# Patient Record
Sex: MALE | Race: WHITE | NOT HISPANIC OR LATINO | Employment: OTHER | URBAN - NONMETROPOLITAN AREA
[De-identification: names, ages, dates, MRNs, and addresses within clinical notes are randomized per-mention and may not be internally consistent; named-entity substitution may affect disease eponyms.]

---

## 2018-01-02 ENCOUNTER — OFFICE VISIT (OUTPATIENT)
Dept: FAMILY MEDICINE CLINIC | Facility: CLINIC | Age: 68
End: 2018-01-02

## 2018-01-02 ENCOUNTER — TELEPHONE (OUTPATIENT)
Dept: FAMILY MEDICINE CLINIC | Facility: CLINIC | Age: 68
End: 2018-01-02

## 2018-01-02 VITALS
OXYGEN SATURATION: 99 % | DIASTOLIC BLOOD PRESSURE: 86 MMHG | BODY MASS INDEX: 23.85 KG/M2 | SYSTOLIC BLOOD PRESSURE: 154 MMHG | HEIGHT: 69 IN | HEART RATE: 67 BPM | WEIGHT: 161 LBS

## 2018-01-02 DIAGNOSIS — R39.12 WEAK URINE STREAM: ICD-10-CM

## 2018-01-02 DIAGNOSIS — R03.0 ELEVATED BLOOD PRESSURE READING: ICD-10-CM

## 2018-01-02 DIAGNOSIS — E78.49 OTHER HYPERLIPIDEMIA: Primary | ICD-10-CM

## 2018-01-02 DIAGNOSIS — E55.9 VITAMIN D DEFICIENCY DISEASE: ICD-10-CM

## 2018-01-02 LAB
25(OH)D3 SERPL-MCNC: 27 NG/ML
ALBUMIN SERPL-MCNC: 4.3 G/DL (ref 3.4–4.8)
ALBUMIN/GLOB SERPL: 1.5 G/DL (ref 1.5–2.5)
ALP SERPL-CCNC: 57 U/L (ref 40–129)
ALT SERPL W P-5'-P-CCNC: 25 U/L (ref 10–44)
ANION GAP SERPL CALCULATED.3IONS-SCNC: 7.5 MMOL/L (ref 3.6–11.2)
AST SERPL-CCNC: 24 U/L (ref 10–34)
BASOPHILS # BLD AUTO: 0.01 10*3/MM3 (ref 0–0.3)
BASOPHILS NFR BLD AUTO: 0.2 % (ref 0–2)
BILIRUB SERPL-MCNC: 0.7 MG/DL (ref 0.2–1.8)
BUN BLD-MCNC: 14 MG/DL (ref 7–21)
BUN/CREAT SERPL: 13.7 (ref 7–25)
CALCIUM SPEC-SCNC: 8.9 MG/DL (ref 7.7–10)
CHLORIDE SERPL-SCNC: 105 MMOL/L (ref 99–112)
CHOLEST SERPL-MCNC: 257 MG/DL (ref 0–200)
CO2 SERPL-SCNC: 28.5 MMOL/L (ref 24.3–31.9)
CREAT BLD-MCNC: 1.02 MG/DL (ref 0.43–1.29)
DEPRECATED RDW RBC AUTO: 41 FL (ref 37–54)
EOSINOPHIL # BLD AUTO: 0.1 10*3/MM3 (ref 0–0.7)
EOSINOPHIL NFR BLD AUTO: 1.8 % (ref 0–7)
ERYTHROCYTE [DISTWIDTH] IN BLOOD BY AUTOMATED COUNT: 12.5 % (ref 11.5–14.5)
GFR SERPL CREATININE-BSD FRML MDRD: 73 ML/MIN/1.73
GLOBULIN UR ELPH-MCNC: 2.9 GM/DL
GLUCOSE BLD-MCNC: 102 MG/DL (ref 70–110)
HCT VFR BLD AUTO: 46.2 % (ref 42–52)
HDLC SERPL-MCNC: 52 MG/DL (ref 60–100)
HGB BLD-MCNC: 14.8 G/DL (ref 14–18)
IMM GRANULOCYTES # BLD: 0.01 10*3/MM3 (ref 0–0.03)
IMM GRANULOCYTES NFR BLD: 0.2 % (ref 0–0.5)
LDLC SERPL CALC-MCNC: 185 MG/DL (ref 0–100)
LDLC/HDLC SERPL: 3.55 {RATIO}
LYMPHOCYTES # BLD AUTO: 1.59 10*3/MM3 (ref 1–3)
LYMPHOCYTES NFR BLD AUTO: 28.2 % (ref 16–46)
MAGNESIUM SERPL-MCNC: 2.2 MG/DL (ref 1.7–2.6)
MCH RBC QN AUTO: 29 PG (ref 27–33)
MCHC RBC AUTO-ENTMCNC: 32 G/DL (ref 33–37)
MCV RBC AUTO: 90.6 FL (ref 80–94)
MONOCYTES # BLD AUTO: 0.51 10*3/MM3 (ref 0.1–0.9)
MONOCYTES NFR BLD AUTO: 9 % (ref 0–12)
NEUTROPHILS # BLD AUTO: 3.42 10*3/MM3 (ref 1.4–6.5)
NEUTROPHILS NFR BLD AUTO: 60.6 % (ref 40–75)
OSMOLALITY SERPL CALC.SUM OF ELEC: 281.9 MOSM/KG (ref 273–305)
PLATELET # BLD AUTO: 269 10*3/MM3 (ref 130–400)
PMV BLD AUTO: 10.7 FL (ref 6–10)
POTASSIUM BLD-SCNC: 4.4 MMOL/L (ref 3.5–5.3)
PROT SERPL-MCNC: 7.2 G/DL (ref 6–8)
PSA SERPL-MCNC: 2.19 NG/ML (ref 0–4)
RBC # BLD AUTO: 5.1 10*6/MM3 (ref 4.7–6.1)
SODIUM BLD-SCNC: 141 MMOL/L (ref 135–153)
TRIGL SERPL-MCNC: 102 MG/DL (ref 0–150)
TSH SERPL DL<=0.05 MIU/L-ACNC: 1.43 MIU/ML (ref 0.55–4.78)
VLDLC SERPL-MCNC: 20.4 MG/DL
WBC NRBC COR # BLD: 5.64 10*3/MM3 (ref 4.5–12.5)

## 2018-01-02 PROCEDURE — 82306 VITAMIN D 25 HYDROXY: CPT | Performed by: NURSE PRACTITIONER

## 2018-01-02 PROCEDURE — 80053 COMPREHEN METABOLIC PANEL: CPT | Performed by: NURSE PRACTITIONER

## 2018-01-02 PROCEDURE — 99214 OFFICE O/P EST MOD 30 MIN: CPT | Performed by: NURSE PRACTITIONER

## 2018-01-02 PROCEDURE — 84153 ASSAY OF PSA TOTAL: CPT | Performed by: NURSE PRACTITIONER

## 2018-01-02 PROCEDURE — 80061 LIPID PANEL: CPT | Performed by: NURSE PRACTITIONER

## 2018-01-02 PROCEDURE — 84443 ASSAY THYROID STIM HORMONE: CPT | Performed by: NURSE PRACTITIONER

## 2018-01-02 PROCEDURE — 85025 COMPLETE CBC W/AUTO DIFF WBC: CPT | Performed by: NURSE PRACTITIONER

## 2018-01-02 PROCEDURE — 83735 ASSAY OF MAGNESIUM: CPT | Performed by: NURSE PRACTITIONER

## 2018-01-02 RX ORDER — CHLORAL HYDRATE 500 MG
1200 CAPSULE ORAL
COMMUNITY

## 2018-01-02 RX ORDER — ASPIRIN 81 MG/1
81 TABLET ORAL DAILY
COMMUNITY
End: 2020-03-03

## 2018-01-02 NOTE — PROGRESS NOTES
Subjective   Jose Luis Ahuja is a 67 y.o. male.     Chief Complaint: Follow-up (fasting if labs are needed )    History of Present Illness   Pt here for follow up today. He has not been to the office for visit in two years.      Skin cancers.  Pt is currently seeing dermatology.    Elevated BP.  Pt BP is elevated today.  He states that he checks his BP at home weekly and his BP is usually controlled at 130s/70s.  He denies any chest pain, SOA, headaches, dizziness or syncope.  He tries to watch his diet and eats generally healthy foods.  He does not use excessive salt in his diet.  He does not wish to start any medication today.  I have discussed the importance of controlled BP.    HLD.  Pt has had an elevated cholesterol level in the past.  He was prescribed Pravachol but did not take any medication.  He is currently taking Garlic and Fish Oil and does not wish to take any other medication.  He exercises daily using his treadmill.  He is doing well.  He has had a stress test in 2015 which was normal.      Pt had screening colonoscopy in 2015 with normal findings.   Hx of vit d def.    Pt does have S/S of BPH.  He states that he does have a weak urine stream at times.  Stream starts and stops.  He denies any dysuria or hematuria.  He has not had a PSA level checked in some time.        Family History   Problem Relation Age of Onset   • Heart disease Mother    • Heart failure Mother    • Liver cancer Father    • Heart attack Father        Social History     Social History   • Marital status:      Spouse name: N/A   • Number of children: N/A   • Years of education: N/A     Occupational History   • Not on file.     Social History Main Topics   • Smoking status: Never Smoker   • Smokeless tobacco: Never Used   • Alcohol use No   • Drug use: No   • Sexual activity: Defer     Other Topics Concern   • Not on file     Social History Narrative   • No narrative on file       Past Medical History:   Diagnosis Date   •  "Hyperlipidemia    • Skin cancer        Review of Systems   Constitutional: Negative.    HENT: Negative.    Respiratory: Negative.    Cardiovascular: Negative.    Gastrointestinal: Negative.    Genitourinary: Positive for decreased urine volume. Negative for difficulty urinating, frequency, hematuria, penile pain, testicular pain and urgency.   Musculoskeletal: Negative.    Skin: Negative.    Neurological: Negative.    Psychiatric/Behavioral: Negative.        Objective   Physical Exam   Constitutional: He is oriented to person, place, and time. He appears well-developed and well-nourished.   Neck: Normal range of motion. Neck supple.   Cardiovascular: Normal rate, regular rhythm and normal heart sounds.    Pulmonary/Chest: Effort normal and breath sounds normal.   Neurological: He is alert and oriented to person, place, and time.   Skin: Skin is warm and dry.   Psychiatric: He has a normal mood and affect. His behavior is normal. Judgment and thought content normal.   Nursing note and vitals reviewed.      Procedures    Vitals: Blood pressure 154/86, pulse 67, height 175.3 cm (69\"), weight 73 kg (161 lb), SpO2 99 %.    Allergies: No Known Allergies       Assessment/Plan   Jose Luis was seen today for follow-up.    Diagnoses and all orders for this visit:    Other hyperlipidemia  -     CBC & Differential  -     Comprehensive Metabolic Panel  -     Lipid Panel  -     Magnesium  -     TSH  -     Vitamin D 25 Hydroxy  -     PSA DIAGNOSTIC  -     CBC Auto Differential    Elevated blood pressure reading  -     CBC & Differential  -     Comprehensive Metabolic Panel  -     Lipid Panel  -     Magnesium  -     TSH  -     Vitamin D 25 Hydroxy  -     PSA DIAGNOSTIC  -     CBC Auto Differential    Weak urine stream  -     CBC & Differential  -     Comprehensive Metabolic Panel  -     Lipid Panel  -     Magnesium  -     TSH  -     Vitamin D 25 Hydroxy  -     PSA DIAGNOSTIC  -     CBC Auto Differential    Vitamin D deficiency " disease  -     Vitamin D 25 Hydroxy

## 2018-01-02 NOTE — TELEPHONE ENCOUNTER
----- Message from MINISTERIO Brady sent at 1/2/2018  2:51 PM EST -----  Labs appear to be stable.  Cholesterol is elevated but he does not wish to take medication.  Please let him know his values and please limit his fried foods.      Spoke with patient & he verbalized understanding.

## 2018-07-11 ENCOUNTER — OFFICE VISIT (OUTPATIENT)
Dept: RETAIL CLINIC | Facility: CLINIC | Age: 68
End: 2018-07-11

## 2018-07-11 VITALS
OXYGEN SATURATION: 97 % | RESPIRATION RATE: 18 BRPM | SYSTOLIC BLOOD PRESSURE: 122 MMHG | WEIGHT: 160 LBS | HEIGHT: 69 IN | DIASTOLIC BLOOD PRESSURE: 82 MMHG | TEMPERATURE: 97.7 F | HEART RATE: 76 BPM | BODY MASS INDEX: 23.7 KG/M2

## 2018-07-11 DIAGNOSIS — J30.1 ACUTE SEASONAL ALLERGIC RHINITIS DUE TO POLLEN: ICD-10-CM

## 2018-07-11 DIAGNOSIS — J02.9 SORE THROAT: ICD-10-CM

## 2018-07-11 LAB
EXPIRATION DATE: NORMAL
INTERNAL CONTROL: NORMAL
Lab: NORMAL
S PYO AG THROAT QL: NEGATIVE

## 2018-07-11 PROCEDURE — 87880 STREP A ASSAY W/OPTIC: CPT | Performed by: NURSE PRACTITIONER

## 2018-07-11 PROCEDURE — 99213 OFFICE O/P EST LOW 20 MIN: CPT | Performed by: NURSE PRACTITIONER

## 2018-07-11 RX ORDER — DIPHENHYDRAMINE, LIDOCAINE, NYSTATIN
5 KIT ORAL 4 TIMES DAILY
Qty: 240 ML | Refills: 0 | Status: SHIPPED | OUTPATIENT
Start: 2018-07-11 | End: 2019-02-15

## 2018-07-11 RX ORDER — AZELASTINE 1 MG/ML
1 SPRAY, METERED NASAL 2 TIMES DAILY
Qty: 1 EACH | Refills: 0 | Status: SHIPPED | OUTPATIENT
Start: 2018-07-11 | End: 2020-03-03

## 2018-07-11 NOTE — PROGRESS NOTES
"Jose Luis Tena) presents to the clinic today c/o upper respiratory symptoms which started  three days ago. Associated symptoms include nasal congestion/drainage, sore throat, and hoarseness. He has tried salt water gargles without adequate relief.     URI    This is a new problem. The current episode started in the past 7 days. The problem has been unchanged. There has been no fever. Associated symptoms include congestion and a sore throat. Pertinent negatives include no coughing, ear pain, headaches, nausea, rash, sinus pain, sneezing, vomiting or wheezing. He has tried NSAIDs (Salt water gargles) for the symptoms. The treatment provided mild relief.     Refer to ROS for additional information.  Vitals:    07/11/18 0948   BP: 122/82   BP Location: Left arm   Patient Position: Sitting   Cuff Size: Adult   Pulse: 76   Resp: 18   Temp: 97.7 °F (36.5 °C)   TempSrc: Oral   SpO2: 97%   Weight: 72.6 kg (160 lb)   Height: 175.3 cm (69\")      The following portions of the patient's history were reviewed and updated as appropriate: allergies, current medications, past family history, past medical history, past social history, past surgical history and problem list.    Review of Systems   Constitutional: Negative for activity change, appetite change, fatigue and fever.   HENT: Positive for congestion, hearing loss (Chronic), postnasal drip, sore throat and voice change. Negative for ear pain, sinus pain, sinus pressure, sneezing, tinnitus and trouble swallowing.    Eyes: Negative for discharge and redness.   Respiratory: Negative for cough, chest tightness and wheezing.    Gastrointestinal: Negative for nausea and vomiting.   Musculoskeletal: Negative for myalgias.   Skin: Negative for color change and rash.   Neurological: Negative for headaches.   Hematological: Negative for adenopathy.   All other systems reviewed and are negative.    Physical Exam   Constitutional: He is oriented to person, place, and time. He appears " well-developed and well-nourished. No distress.   HENT:   Head: Normocephalic.   Right Ear: Ear canal normal. Tympanic membrane is not erythematous and not bulging. A middle ear effusion is present. Decreased hearing is noted.   Left Ear: Ear canal normal. Tympanic membrane is not erythematous and not bulging. A middle ear effusion is present. Decreased hearing is noted.   Nose: Mucosal edema present. No sinus tenderness. Right sinus exhibits no maxillary sinus tenderness and no frontal sinus tenderness. Left sinus exhibits no maxillary sinus tenderness and no frontal sinus tenderness.   Mouth/Throat: Mucous membranes are normal. Oral lesions present. No uvula swelling. No oropharyngeal exudate or posterior oropharyngeal erythema.       Eyes: Conjunctivae are normal. Pupils are equal, round, and reactive to light. Right eye exhibits no discharge. Left eye exhibits no discharge. No scleral icterus.   Neck: Neck supple. No neck rigidity.   Cardiovascular: Normal rate, regular rhythm and normal heart sounds.  Exam reveals no friction rub.    No murmur heard.  Pulmonary/Chest: Effort normal and breath sounds normal. No respiratory distress. He has no decreased breath sounds. He has no wheezes. He has no rhonchi. He has no rales.   Musculoskeletal: He exhibits no edema.   Lymphadenopathy:        Head (right side): No tonsillar adenopathy present.        Head (left side): No tonsillar adenopathy present.     He has no cervical adenopathy.   Neurological: He is alert and oriented to person, place, and time.   Skin: Skin is warm and dry. No rash noted. No erythema.   Psychiatric: He has a normal mood and affect. His behavior is normal. Thought content normal.   Vitals reviewed.    Assessment/Plan   Problems Addressed this Visit     None      Visit Diagnoses     Acute seasonal allergic rhinitis due to pollen        Findings and recommendations discussed with Dale. Reviewed his strep test which was negative.     Relevant  Medications    azelastine (ASTELIN) 0.1 % nasal spray    Sore throat        Reviewed his strep test which was negative.     Relevant Medications    nystatin susp + lidocaine viscous (MAGIC MOUTHWASH) oral suspension    Other Relevant Orders    POC Rapid Strep A (Completed)        Findings and recommendations discussed with Dale. Reviewed his strep test which was negative. Offered throat culture which he declined. Treatment options reviewed. Counseled regarding supportive care measures. Encouraged him to seek further medical evaluation if symptoms worsen or do not improve within 48-72 hours.  Lab Results   Component Value Date    RAPSCRN Negative 07/11/2018

## 2018-07-11 NOTE — PATIENT INSTRUCTIONS
Pharyngitis  Pharyngitis is a sore throat (pharynx). There is redness, pain, and swelling of your throat.  Follow these instructions at home:  · Drink enough fluids to keep your pee (urine) clear or pale yellow.  · Only take medicine as told by your doctor.  ? You may get sick again if you do not take medicine as told. Finish your medicines, even if you start to feel better.  ? Do not take aspirin.  · Rest.  · Rinse your mouth (gargle) with salt water (½ tsp of salt per 1 qt of water) every 1-2 hours. This will help the pain.  · If you are not at risk for choking, you can suck on hard candy or sore throat lozenges.  Contact a doctor if:  · You have large, tender lumps on your neck.  · You have a rash.  · You cough up green, yellow-brown, or bloody spit.  Get help right away if:  · You have a stiff neck.  · You drool or cannot swallow liquids.  · You throw up (vomit) or are not able to keep medicine or liquids down.  · You have very bad pain that does not go away with medicine.  · You have problems breathing (not from a stuffy nose).  This information is not intended to replace advice given to you by your health care provider. Make sure you discuss any questions you have with your health care provider.  Document Released: 06/05/2009 Document Revised: 05/25/2017 Document Reviewed: 08/25/2014  [employer] Interactive Patient Education © 2017 [employer]

## 2019-02-15 ENCOUNTER — OFFICE VISIT (OUTPATIENT)
Dept: FAMILY MEDICINE CLINIC | Facility: CLINIC | Age: 69
End: 2019-02-15

## 2019-02-15 VITALS
SYSTOLIC BLOOD PRESSURE: 136 MMHG | WEIGHT: 157 LBS | OXYGEN SATURATION: 97 % | BODY MASS INDEX: 23.25 KG/M2 | HEIGHT: 69 IN | TEMPERATURE: 98 F | HEART RATE: 75 BPM | DIASTOLIC BLOOD PRESSURE: 81 MMHG

## 2019-02-15 DIAGNOSIS — E78.49 OTHER HYPERLIPIDEMIA: Primary | ICD-10-CM

## 2019-02-15 DIAGNOSIS — E55.9 VITAMIN D DEFICIENCY DISEASE: ICD-10-CM

## 2019-02-15 LAB
25(OH)D3 SERPL-MCNC: 32 NG/ML
ALBUMIN SERPL-MCNC: 4.4 G/DL (ref 3.4–4.8)
ALBUMIN/GLOB SERPL: 1.6 G/DL (ref 1.5–2.5)
ALP SERPL-CCNC: 61 U/L (ref 40–129)
ALT SERPL W P-5'-P-CCNC: 28 U/L (ref 10–44)
ANION GAP SERPL CALCULATED.3IONS-SCNC: 4.6 MMOL/L (ref 3.6–11.2)
AST SERPL-CCNC: 24 U/L (ref 10–34)
BASOPHILS # BLD AUTO: 0.01 10*3/MM3 (ref 0–0.3)
BASOPHILS NFR BLD AUTO: 0.2 % (ref 0–2)
BILIRUB SERPL-MCNC: 0.8 MG/DL (ref 0.2–1.8)
BUN BLD-MCNC: 12 MG/DL (ref 7–21)
BUN/CREAT SERPL: 11.7 (ref 7–25)
CALCIUM SPEC-SCNC: 9.5 MG/DL (ref 7.7–10)
CHLORIDE SERPL-SCNC: 105 MMOL/L (ref 99–112)
CHOLEST SERPL-MCNC: 213 MG/DL (ref 0–200)
CO2 SERPL-SCNC: 28.4 MMOL/L (ref 24.3–31.9)
CREAT BLD-MCNC: 1.03 MG/DL (ref 0.43–1.29)
DEPRECATED RDW RBC AUTO: 40.9 FL (ref 37–54)
EOSINOPHIL # BLD AUTO: 0.14 10*3/MM3 (ref 0–0.7)
EOSINOPHIL NFR BLD AUTO: 2.3 % (ref 0–7)
ERYTHROCYTE [DISTWIDTH] IN BLOOD BY AUTOMATED COUNT: 12.5 % (ref 11.5–14.5)
GFR SERPL CREATININE-BSD FRML MDRD: 72 ML/MIN/1.73
GLOBULIN UR ELPH-MCNC: 2.7 GM/DL
GLUCOSE BLD-MCNC: 92 MG/DL (ref 70–110)
HCT VFR BLD AUTO: 46.7 % (ref 42–52)
HDLC SERPL-MCNC: 47 MG/DL (ref 60–100)
HGB BLD-MCNC: 15.3 G/DL (ref 14–18)
IMM GRANULOCYTES # BLD AUTO: 0.01 10*3/MM3 (ref 0–0.03)
IMM GRANULOCYTES NFR BLD AUTO: 0.2 % (ref 0–0.5)
LDLC SERPL CALC-MCNC: 144 MG/DL (ref 0–100)
LDLC/HDLC SERPL: 3.06 {RATIO}
LYMPHOCYTES # BLD AUTO: 1.76 10*3/MM3 (ref 1–3)
LYMPHOCYTES NFR BLD AUTO: 28.5 % (ref 16–46)
MAGNESIUM SERPL-MCNC: 2.3 MG/DL (ref 1.7–2.6)
MCH RBC QN AUTO: 29.4 PG (ref 27–33)
MCHC RBC AUTO-ENTMCNC: 32.8 G/DL (ref 33–37)
MCV RBC AUTO: 89.6 FL (ref 80–94)
MONOCYTES # BLD AUTO: 0.51 10*3/MM3 (ref 0.1–0.9)
MONOCYTES NFR BLD AUTO: 8.3 % (ref 0–12)
NEUTROPHILS # BLD AUTO: 3.74 10*3/MM3 (ref 1.4–6.5)
NEUTROPHILS NFR BLD AUTO: 60.5 % (ref 40–75)
OSMOLALITY SERPL CALC.SUM OF ELEC: 275.1 MOSM/KG (ref 273–305)
PLATELET # BLD AUTO: 281 10*3/MM3 (ref 130–400)
PMV BLD AUTO: 10.8 FL (ref 6–10)
POTASSIUM BLD-SCNC: 4.2 MMOL/L (ref 3.5–5.3)
PROT SERPL-MCNC: 7.1 G/DL (ref 6–8)
RBC # BLD AUTO: 5.21 10*6/MM3 (ref 4.7–6.1)
SODIUM BLD-SCNC: 138 MMOL/L (ref 135–153)
TRIGL SERPL-MCNC: 110 MG/DL (ref 0–150)
TSH SERPL DL<=0.05 MIU/L-ACNC: 1.12 MIU/ML (ref 0.55–4.78)
VIT B12 BLD-MCNC: 343 PG/ML (ref 211–911)
VLDLC SERPL-MCNC: 22 MG/DL
WBC NRBC COR # BLD: 6.17 10*3/MM3 (ref 4.5–12.5)

## 2019-02-15 PROCEDURE — 80053 COMPREHEN METABOLIC PANEL: CPT | Performed by: NURSE PRACTITIONER

## 2019-02-15 PROCEDURE — 82607 VITAMIN B-12: CPT | Performed by: NURSE PRACTITIONER

## 2019-02-15 PROCEDURE — 99213 OFFICE O/P EST LOW 20 MIN: CPT | Performed by: NURSE PRACTITIONER

## 2019-02-15 PROCEDURE — 84443 ASSAY THYROID STIM HORMONE: CPT | Performed by: NURSE PRACTITIONER

## 2019-02-15 PROCEDURE — 85025 COMPLETE CBC W/AUTO DIFF WBC: CPT | Performed by: NURSE PRACTITIONER

## 2019-02-15 PROCEDURE — 80061 LIPID PANEL: CPT | Performed by: NURSE PRACTITIONER

## 2019-02-15 PROCEDURE — 82306 VITAMIN D 25 HYDROXY: CPT | Performed by: NURSE PRACTITIONER

## 2019-02-15 PROCEDURE — 83735 ASSAY OF MAGNESIUM: CPT | Performed by: NURSE PRACTITIONER

## 2019-02-15 NOTE — PROGRESS NOTES
Subjective   Jose Luis Ahuja is a 68 y.o. male.     Chief Complaint: Follow-up; Hyperlipidemia; and Rectal Bleeding    Hyperlipidemia   This is a chronic problem. The current episode started more than 1 year ago. The problem is uncontrolled. Factors aggravating his hyperlipidemia include fatty foods. Current antihyperlipidemic treatment includes exercise.   Rectal Bleeding   This is a new (hx of diverticulosis; last colonoscopy 2015 with normal findings) problem. The current episode started more than 1 month ago. The problem has been resolved. Pertinent negatives include no abdominal pain, change in bowel habit or fatigue. Nothing aggravates the symptoms. He has tried nothing for the symptoms.     Pt has had an elevated cholesterol level in the past.  He was prescribed Pravachol but did not take any medication.  He is currently taking Garlic and Fish Oil and does not wish to take any other medication.  He exercises daily using his treadmill.  He is doing well.  He has had a stress test in 2015 which was normal.           Family History   Problem Relation Age of Onset   • Heart disease Mother    • Heart failure Mother    • Liver cancer Father    • Heart attack Father        Social History     Socioeconomic History   • Marital status:      Spouse name: Not on file   • Number of children: Not on file   • Years of education: Not on file   • Highest education level: Not on file   Social Needs   • Financial resource strain: Not on file   • Food insecurity - worry: Not on file   • Food insecurity - inability: Not on file   • Transportation needs - medical: Not on file   • Transportation needs - non-medical: Not on file   Occupational History   • Occupation: Not working   Tobacco Use   • Smoking status: Never Smoker   • Smokeless tobacco: Never Used   Substance and Sexual Activity   • Alcohol use: No   • Drug use: No   • Sexual activity: Defer   Other Topics Concern   • Not on file   Social History Narrative   • Not  "on file       Past Medical History:   Diagnosis Date   • Hyperlipidemia    • Skin cancer        Review of Systems   Constitutional: Negative.  Negative for fatigue.   HENT: Negative.    Respiratory: Negative.    Cardiovascular: Negative.    Gastrointestinal: Positive for hematochezia. Negative for abdominal pain and change in bowel habit.   Musculoskeletal: Negative.    Skin: Negative.    Neurological: Negative.    Psychiatric/Behavioral: Negative.        Objective   Physical Exam   Constitutional: He is oriented to person, place, and time. He appears well-developed and well-nourished.   Neck: Normal range of motion. Neck supple.   Cardiovascular: Normal rate, regular rhythm and normal heart sounds.   Pulmonary/Chest: Effort normal and breath sounds normal.   Neurological: He is alert and oriented to person, place, and time.   Skin: Skin is warm and dry.   Psychiatric: He has a normal mood and affect. His behavior is normal. Judgment and thought content normal.   Nursing note and vitals reviewed.      Procedures    Vitals: Blood pressure 136/81, pulse 75, temperature 98 °F (36.7 °C), temperature source Oral, height 175.3 cm (69\"), weight 71.2 kg (157 lb), SpO2 97 %.    Allergies: No Known Allergies     During this visit the following were done:  Labs Reviewed []    Labs Ordered []    Radiology Reports Reviewed []    Radiology Ordered []    PCP Records Reviewed []    Referring Provider Records Reviewed []    ER Records Reviewed []    Hospital Records Reviewed []    History Obtained From Family []    Radiology Images Reviewed []    Other Reviewed []    Records Requested []      Assessment/Plan   Jose Luis was seen today for follow-up, hyperlipidemia and rectal bleeding.    Diagnoses and all orders for this visit:    Other hyperlipidemia  -     CBC & Differential  -     Comprehensive Metabolic Panel  -     Magnesium  -     Lipid Panel  -     TSH  -     Vitamin B12  -     Vitamin D 25 Hydroxy    Vitamin D deficiency " disease  -     CBC & Differential  -     Comprehensive Metabolic Panel  -     Magnesium  -     Lipid Panel  -     TSH  -     Vitamin B12  -     Vitamin D 25 Hydroxy      Stool cards x3 given to patient with instructions

## 2019-02-19 ENCOUNTER — TELEPHONE (OUTPATIENT)
Dept: FAMILY MEDICINE CLINIC | Facility: CLINIC | Age: 69
End: 2019-02-19

## 2019-02-19 ENCOUNTER — CLINICAL SUPPORT (OUTPATIENT)
Dept: FAMILY MEDICINE CLINIC | Facility: CLINIC | Age: 69
End: 2019-02-19

## 2019-02-19 DIAGNOSIS — Z12.11 ENCOUNTER FOR SCREENING FECAL OCCULT BLOOD TESTING: Primary | ICD-10-CM

## 2019-02-19 LAB
EXPIRATION DATE: ABNORMAL
GASTROCULT GAST QL: POSITIVE
HEMOCCULT SP2 STL QL: POSITIVE
HEMOCCULT SP3 STL QL: POSITIVE
Lab: ABNORMAL

## 2019-02-19 PROCEDURE — 82274 ASSAY TEST FOR BLOOD FECAL: CPT | Performed by: NURSE PRACTITIONER

## 2019-02-19 NOTE — PROGRESS NOTES
Labs appear to be stable.  Cholesterol has improved.  However, his 3 occult blood stool cards were positive.  I suggest referral to gastro for evaluation.   Is he agreeable and who would he like to see?

## 2019-02-20 ENCOUNTER — TELEPHONE (OUTPATIENT)
Dept: FAMILY MEDICINE CLINIC | Facility: CLINIC | Age: 69
End: 2019-02-20

## 2019-02-20 DIAGNOSIS — R19.5 POSITIVE FECAL OCCULT BLOOD TEST: Primary | ICD-10-CM

## 2019-02-20 NOTE — TELEPHONE ENCOUNTER
----- Message from MINISTERIO Brady sent at 2/19/2019  6:10 PM EST -----  Labs appear to be stable.  Cholesterol has improved.  However, his 3 occult blood stool cards were positive.  I suggest referral to gastro for evaluation.   Is he agreeable and who would he like to see?    Spoke with patient & he verbalized understanding,agreeable to GI consult,no preference.

## 2019-09-03 ENCOUNTER — OFFICE VISIT (OUTPATIENT)
Dept: FAMILY MEDICINE CLINIC | Facility: CLINIC | Age: 69
End: 2019-09-03

## 2019-09-03 VITALS
SYSTOLIC BLOOD PRESSURE: 122 MMHG | BODY MASS INDEX: 22.96 KG/M2 | DIASTOLIC BLOOD PRESSURE: 80 MMHG | TEMPERATURE: 97.8 F | OXYGEN SATURATION: 98 % | HEIGHT: 69 IN | HEART RATE: 67 BPM | WEIGHT: 155 LBS

## 2019-09-03 DIAGNOSIS — Z00.00 ENCOUNTER FOR MEDICARE ANNUAL WELLNESS EXAM: Primary | ICD-10-CM

## 2019-09-03 PROCEDURE — G0438 PPPS, INITIAL VISIT: HCPCS | Performed by: NURSE PRACTITIONER

## 2019-09-03 RX ORDER — OMEGA-3S/DHA/EPA/FISH OIL/D3 300MG-1000
400 CAPSULE ORAL DAILY
COMMUNITY

## 2019-09-03 NOTE — PROGRESS NOTES
Initial Medicare Wellness Visit   The ABC's of the Annual Wellness Visit    Chief Complaint   Patient presents with   • Medicare Wellness-Initial Visit       HPI:  Jose Luis Ahuja YOB: 1950, is a 69 y.o. male who presents for an Initial Medicare Wellness Visit.    Recent Hospitalizations:  No hospitalization(s) within the last year..    Current Medical Providers:  Patient Care Team:  Shirley Moreno APRN as PCP - General  Shirley Moreno APRN as PCP - Family Medicine  Omaira Barber APRN as PCP - Claims Attributed    Health Habits and Functional and Cognitive Screening and Depression Screening:  Functional & Cognitive Status 9/3/2019   Do you have difficulty preparing food and eating? No   Do you have difficulty bathing yourself, getting dressed or grooming yourself? No   Do you have difficulty using the toilet? No   Do you have difficulty moving around from place to place? No   Do you have trouble with steps or getting out of a bed or a chair? No   Current Diet Well Balanced Diet   Dental Exam Not up to date   Eye Exam Not up to date   Exercise (times per week) 4 times per week   Current Exercise Activities Include Walking   Do you need help using the phone?  No   Are you deaf or do you have serious difficulty hearing?  Yes   Do you need help with transportation? No   Do you need help shopping? No   Do you need help preparing meals?  No   Do you need help with housework?  No   Do you need help with laundry? No   Do you need help taking your medications? No   Do you need help managing money? No   Do you ever drive or ride in a car without wearing a seat belt? No   Have you felt unusual stress, anger or loneliness in the last month? No   Who do you live with? Spouse   If you need help, do you have trouble finding someone available to you? No   Have you been bothered in the last four weeks by sexual problems? (No Data)   Do you have difficulty concentrating, remembering or making decisions? No        Compared to one year ago, the patient feels his physical health is the same and his mental health is the same.    Depression Screen:  PHQ-2/PHQ-9 Depression Screening 9/3/2019   Little interest or pleasure in doing things 0   Feeling down, depressed, or hopeless 0   Total Score 0         Past Medical/Family/Social History:  The following portions of the patient's history were reviewed and updated as appropriate: allergies, current medications, past family history, past medical history, past social history, past surgical history and problem list.    No Known Allergies      Current Outpatient Medications:   •  cholecalciferol (VITAMIN D3) 400 units tablet, Take 400 Units by mouth Daily., Disp: , Rfl:   •  GARLIC OIL PO, Take  by mouth., Disp: , Rfl:   •  Omega-3 Fatty Acids (FISH OIL) 1000 MG capsule capsule, Take 1,200 mg by mouth Daily With Breakfast., Disp: , Rfl:   •  aspirin 81 MG EC tablet, Take 81 mg by mouth Daily., Disp: , Rfl:   •  azelastine (ASTELIN) 0.1 % nasal spray, 1 spray into each nostril 2 (Two) Times a Day. 2 sprays both nostrils twice daily as needed, Disp: 1 each, Rfl: 0    Aspirin use counseling: Does not need ASA (and currently is not on it)    Current medication list contains no high risk medications.  No harmful drug interactions have been identified.     Family History   Problem Relation Age of Onset   • Heart disease Mother    • Heart failure Mother    • Liver cancer Father    • Heart attack Father        Social History     Tobacco Use   • Smoking status: Never Smoker   • Smokeless tobacco: Never Used   Substance Use Topics   • Alcohol use: No       Past Surgical History:   Procedure Laterality Date   • COLONOSCOPY  06/14/2019    Dr. Martines    • SKIN CANCER EXCISION      Mult.        Patient Active Problem List   Diagnosis   • Other hyperlipidemia   • Elevated blood pressure reading   • Vitamin D deficiency disease       Review of Systems   Constitutional: Negative.    HENT:  "Negative.    Respiratory: Negative.    Cardiovascular: Negative.    Gastrointestinal: Negative.    Musculoskeletal: Negative.    Skin: Negative.    Neurological: Negative.    Psychiatric/Behavioral: Negative.        Objective     Vitals:    09/03/19 1053   BP: 122/80   BP Location: Right arm   Patient Position: Sitting   Cuff Size: Adult   Pulse: 67   Temp: 97.8 °F (36.6 °C)   TempSrc: Oral   SpO2: 98%   Weight: 70.3 kg (155 lb)   Height: 175.3 cm (69\")       Patient's Body mass index is 22.89 kg/m². BMI is within normal parameters. No follow-up required..       Visual Acuity Screening    Right eye Left eye Both eyes   Without correction: 20/50 20/50 20/30   With correction:          The patient has no evidence of cognitve impairment.     Physical Exam   Constitutional: He is oriented to person, place, and time. He appears well-developed and well-nourished.   Eyes: Conjunctivae are normal.   Neck: Normal range of motion.   Cardiovascular: Normal rate, regular rhythm and normal heart sounds.   Pulmonary/Chest: Effort normal and breath sounds normal.   Musculoskeletal: Normal range of motion.   Neurological: He is alert and oriented to person, place, and time.   Skin: Skin is warm and dry.   Nursing note and vitals reviewed.      Recent Lab Results:     Lab Results   Component Value Date    CHOL 213 (H) 02/15/2019    TRIG 110 02/15/2019    HDL 47 (L) 02/15/2019    VLDL 22 02/15/2019    LDLHDL 3.06 02/15/2019         Assessment/Plan   Age-appropriate Screening Schedule:  Refer to the list below for future screening recommendations based on patient's age, sex and/or medical conditions.      Health Maintenance   Topic Date Due   • PNEUMOCOCCAL VACCINES (65+ LOW/MEDIUM RISK) (1 of 2 - PCV13) 09/03/2019 (Originally 5/29/2014)   • ZOSTER VACCINE (1 of 2) 09/03/2019 (Originally 5/29/1999)   • TDAP/TD VACCINES (1 - Tdap) 09/03/2020 (Originally 5/29/1969)   • LIPID PANEL  02/15/2020   • COLONOSCOPY  06/14/2029   • INFLUENZA " VACCINE  Addressed       Medicare Risks and Personalized Health Plan:  Cardiovascular risk  Glaucoma Risk  Inactivity/Sedentary  Prostate Cancer Screening       CMS-Preventive Services Quick Reference  Medicare Preventive Services Addressed:  Annual Wellness Visit (AWV)  Influenza Vaccine and Administration  Pneumococcal Vaccine and Administration  Prostate Cancer Screening     Advance Care Planning:  Patient has an advance directive - a copy has been provided and is visible in patient header    Diagnoses and all orders for this visit:    1. Encounter for Medicare annual wellness exam (Primary)        An After Visit Summary and PPPS with all of these plans were given to the patient.      Follow Up:  Return in about 6 months (around 3/3/2020).

## 2020-03-03 ENCOUNTER — OFFICE VISIT (OUTPATIENT)
Dept: FAMILY MEDICINE CLINIC | Facility: CLINIC | Age: 70
End: 2020-03-03

## 2020-03-03 VITALS
BODY MASS INDEX: 23.7 KG/M2 | HEIGHT: 69 IN | HEART RATE: 75 BPM | DIASTOLIC BLOOD PRESSURE: 76 MMHG | WEIGHT: 160 LBS | SYSTOLIC BLOOD PRESSURE: 130 MMHG | OXYGEN SATURATION: 99 % | TEMPERATURE: 98.3 F

## 2020-03-03 DIAGNOSIS — E78.49 OTHER HYPERLIPIDEMIA: Primary | ICD-10-CM

## 2020-03-03 DIAGNOSIS — Z12.5 SCREENING PSA (PROSTATE SPECIFIC ANTIGEN): ICD-10-CM

## 2020-03-03 DIAGNOSIS — E55.9 VITAMIN D DEFICIENCY DISEASE: ICD-10-CM

## 2020-03-03 PROCEDURE — G0103 PSA SCREENING: HCPCS | Performed by: NURSE PRACTITIONER

## 2020-03-03 PROCEDURE — 83735 ASSAY OF MAGNESIUM: CPT | Performed by: NURSE PRACTITIONER

## 2020-03-03 PROCEDURE — 80053 COMPREHEN METABOLIC PANEL: CPT | Performed by: NURSE PRACTITIONER

## 2020-03-03 PROCEDURE — 36415 COLL VENOUS BLD VENIPUNCTURE: CPT | Performed by: NURSE PRACTITIONER

## 2020-03-03 PROCEDURE — 82607 VITAMIN B-12: CPT | Performed by: NURSE PRACTITIONER

## 2020-03-03 PROCEDURE — 84443 ASSAY THYROID STIM HORMONE: CPT | Performed by: NURSE PRACTITIONER

## 2020-03-03 PROCEDURE — 99213 OFFICE O/P EST LOW 20 MIN: CPT | Performed by: NURSE PRACTITIONER

## 2020-03-03 PROCEDURE — 80061 LIPID PANEL: CPT | Performed by: NURSE PRACTITIONER

## 2020-03-03 PROCEDURE — 82306 VITAMIN D 25 HYDROXY: CPT | Performed by: NURSE PRACTITIONER

## 2020-03-03 PROCEDURE — 85025 COMPLETE CBC W/AUTO DIFF WBC: CPT | Performed by: NURSE PRACTITIONER

## 2020-03-03 NOTE — PROGRESS NOTES
Subjective   Jose Luis Ahuja is a 69 y.o. male.     Chief Complaint: Follow-up and Hyperlipidemia    History of Present Illness   Hyperlipidemia   This is a chronic problem. The current episode started more than 1 year ago. The problem is uncontrolled. Factors aggravating his hyperlipidemia include fatty foods. Current antihyperlipidemic treatment includes exercise.  Pt has had an elevated cholesterol level in the past.  He was prescribed Pravachol but did not take any medication.  He is currently taking Garlic and Fish Oil and does not wish to take any other medication.  He exercises daily using his treadmill.  He is doing well.  He has had a stress test in 2015 which was normal.    He has no complaints today.     Family History   Problem Relation Age of Onset   • Heart disease Mother    • Heart failure Mother    • Liver cancer Father    • Heart attack Father        Social History     Socioeconomic History   • Marital status:      Spouse name: Not on file   • Number of children: Not on file   • Years of education: Not on file   • Highest education level: Not on file   Occupational History   • Occupation: Not working   Tobacco Use   • Smoking status: Never Smoker   • Smokeless tobacco: Never Used   Substance and Sexual Activity   • Alcohol use: No   • Drug use: No   • Sexual activity: Defer       Past Medical History:   Diagnosis Date   • Colon polyps    • Hyperlipidemia    • Skin cancer    • Vitamin D deficiency        Review of Systems   Constitutional: Negative.    HENT: Negative.    Respiratory: Negative.    Cardiovascular: Negative.    Gastrointestinal: Negative.    Musculoskeletal: Negative.    Skin: Negative.    Neurological: Negative.    Psychiatric/Behavioral: Negative.        Objective   Physical Exam   Constitutional: He is oriented to person, place, and time. He appears well-developed and well-nourished.   Neck: Normal range of motion. Neck supple.   Cardiovascular: Normal rate, regular rhythm and  "normal heart sounds.   Pulmonary/Chest: Effort normal and breath sounds normal.   Neurological: He is alert and oriented to person, place, and time.   Skin: Skin is warm and dry.   Psychiatric: He has a normal mood and affect. His behavior is normal. Judgment and thought content normal.   Nursing note and vitals reviewed.      Procedures    Vitals: Blood pressure 130/76, pulse 75, temperature 98.3 °F (36.8 °C), temperature source Oral, height 175.3 cm (69\"), weight 72.6 kg (160 lb), SpO2 99 %.    Body mass index is 23.63 kg/m².     Allergies: No Known Allergies     During this visit the following were done:  Labs Reviewed []    Labs Ordered []    Radiology Reports Reviewed []    Radiology Ordered []    PCP Records Reviewed []    Referring Provider Records Reviewed []    ER Records Reviewed []    Hospital Records Reviewed []    History Obtained From Family []    Radiology Images Reviewed []    Other Reviewed []    Records Requested []      Assessment/Plan   Jose Luis was seen today for follow-up and hyperlipidemia.    Diagnoses and all orders for this visit:    Other hyperlipidemia  -     CBC & Differential; Future  -     Comprehensive Metabolic Panel; Future  -     Lipid Panel; Future  -     Magnesium; Future  -     TSH; Future  -     Vitamin B12; Future  -     Vitamin D 25 Hydroxy; Future  -     PSA Screen; Future  -     CBC & Differential  -     Comprehensive Metabolic Panel  -     Lipid Panel  -     Magnesium  -     TSH  -     Vitamin B12  -     Vitamin D 25 Hydroxy  -     PSA Screen  -     CBC Auto Differential    Vitamin D deficiency disease  -     CBC & Differential; Future  -     Comprehensive Metabolic Panel; Future  -     Lipid Panel; Future  -     Magnesium; Future  -     TSH; Future  -     Vitamin B12; Future  -     Vitamin D 25 Hydroxy; Future  -     PSA Screen; Future  -     CBC & Differential  -     Comprehensive Metabolic Panel  -     Lipid Panel  -     Magnesium  -     TSH  -     Vitamin B12  -     " Vitamin D 25 Hydroxy  -     PSA Screen  -     CBC Auto Differential    Screening PSA (prostate specific antigen)  -     PSA Screen; Future  -     PSA Screen

## 2020-03-04 ENCOUNTER — TELEPHONE (OUTPATIENT)
Dept: FAMILY MEDICINE CLINIC | Facility: CLINIC | Age: 70
End: 2020-03-04

## 2020-03-04 LAB
25(OH)D3 SERPL-MCNC: 23.2 NG/ML (ref 30–100)
ALBUMIN SERPL-MCNC: 4.3 G/DL (ref 3.5–5.2)
ALBUMIN/GLOB SERPL: 1.7 G/DL
ALP SERPL-CCNC: 61 U/L (ref 39–117)
ALT SERPL W P-5'-P-CCNC: 25 U/L (ref 1–41)
ANION GAP SERPL CALCULATED.3IONS-SCNC: 11.1 MMOL/L (ref 5–15)
AST SERPL-CCNC: 22 U/L (ref 1–40)
BASOPHILS # BLD AUTO: 0.02 10*3/MM3 (ref 0–0.2)
BASOPHILS NFR BLD AUTO: 0.3 % (ref 0–1.5)
BILIRUB SERPL-MCNC: 0.4 MG/DL (ref 0.2–1.2)
BUN BLD-MCNC: 12 MG/DL (ref 8–23)
BUN/CREAT SERPL: 11 (ref 7–25)
CALCIUM SPEC-SCNC: 9 MG/DL (ref 8.6–10.5)
CHLORIDE SERPL-SCNC: 100 MMOL/L (ref 98–107)
CHOLEST SERPL-MCNC: 246 MG/DL (ref 0–200)
CO2 SERPL-SCNC: 27.9 MMOL/L (ref 22–29)
CREAT BLD-MCNC: 1.09 MG/DL (ref 0.76–1.27)
DEPRECATED RDW RBC AUTO: 42.3 FL (ref 37–54)
EOSINOPHIL # BLD AUTO: 0.1 10*3/MM3 (ref 0–0.4)
EOSINOPHIL NFR BLD AUTO: 1.7 % (ref 0.3–6.2)
ERYTHROCYTE [DISTWIDTH] IN BLOOD BY AUTOMATED COUNT: 12.8 % (ref 12.3–15.4)
GFR SERPL CREATININE-BSD FRML MDRD: 67 ML/MIN/1.73
GLOBULIN UR ELPH-MCNC: 2.5 GM/DL
GLUCOSE BLD-MCNC: 95 MG/DL (ref 65–99)
HCT VFR BLD AUTO: 47 % (ref 37.5–51)
HDLC SERPL-MCNC: 48 MG/DL (ref 40–60)
HGB BLD-MCNC: 15.9 G/DL (ref 13–17.7)
IMM GRANULOCYTES # BLD AUTO: 0.02 10*3/MM3 (ref 0–0.05)
IMM GRANULOCYTES NFR BLD AUTO: 0.3 % (ref 0–0.5)
LDLC SERPL CALC-MCNC: 171 MG/DL (ref 0–100)
LDLC/HDLC SERPL: 3.57 {RATIO}
LYMPHOCYTES # BLD AUTO: 1.76 10*3/MM3 (ref 0.7–3.1)
LYMPHOCYTES NFR BLD AUTO: 29.2 % (ref 19.6–45.3)
MAGNESIUM SERPL-MCNC: 2.3 MG/DL (ref 1.6–2.4)
MCH RBC QN AUTO: 30.4 PG (ref 26.6–33)
MCHC RBC AUTO-ENTMCNC: 33.8 G/DL (ref 31.5–35.7)
MCV RBC AUTO: 89.9 FL (ref 79–97)
MONOCYTES # BLD AUTO: 0.49 10*3/MM3 (ref 0.1–0.9)
MONOCYTES NFR BLD AUTO: 8.1 % (ref 5–12)
NEUTROPHILS # BLD AUTO: 3.64 10*3/MM3 (ref 1.7–7)
NEUTROPHILS NFR BLD AUTO: 60.4 % (ref 42.7–76)
NRBC BLD AUTO-RTO: 0 /100 WBC (ref 0–0.2)
PLATELET # BLD AUTO: 277 10*3/MM3 (ref 140–450)
PMV BLD AUTO: 11.3 FL (ref 6–12)
POTASSIUM BLD-SCNC: 4.2 MMOL/L (ref 3.5–5.2)
PROT SERPL-MCNC: 6.8 G/DL (ref 6–8.5)
PSA SERPL-MCNC: 2.37 NG/ML (ref 0–4)
RBC # BLD AUTO: 5.23 10*6/MM3 (ref 4.14–5.8)
SODIUM BLD-SCNC: 139 MMOL/L (ref 136–145)
TRIGL SERPL-MCNC: 134 MG/DL (ref 0–150)
TSH SERPL DL<=0.05 MIU/L-ACNC: 1.61 UIU/ML (ref 0.27–4.2)
VIT B12 BLD-MCNC: 247 PG/ML (ref 211–946)
VLDLC SERPL-MCNC: 26.8 MG/DL (ref 5–40)
WBC NRBC COR # BLD: 6.03 10*3/MM3 (ref 3.4–10.8)

## 2020-03-04 NOTE — TELEPHONE ENCOUNTER
----- Message from MINISTERIO Brady sent at 3/4/2020 10:21 AM EST -----  His cholesterol is elevated more than usual.  Is he agreeable to taking a statin?    His  Vit D is a little low and I would suggest taking an otc daily Vit D supplement.  Please let him know.      Left a message to return call.      Spoke with patient & he verbalized understanding,he is not agreeable to a statin at this time,stated he would think about it & if he decides will let you know.

## 2020-03-04 NOTE — PROGRESS NOTES
His cholesterol is elevated more than usual.  Is he agreeable to taking a statin?    His  Vit D is a little low and I would suggest taking an otc daily Vit D supplement.  Please let him know.

## 2020-05-05 ENCOUNTER — TELEPHONE (OUTPATIENT)
Dept: FAMILY MEDICINE CLINIC | Facility: CLINIC | Age: 70
End: 2020-05-05

## 2020-05-05 NOTE — TELEPHONE ENCOUNTER
Patient called indicating he wasn't feeling well last night and took his temp that initially read as 98.3.  About an hour later he was chilling and after checking it again it was 100.7.  No other symptoms noted.  No signs of COVID and no fever now.  Wanted to make you aware.

## 2020-10-30 ENCOUNTER — FLU SHOT (OUTPATIENT)
Dept: FAMILY MEDICINE CLINIC | Facility: CLINIC | Age: 70
End: 2020-10-30

## 2020-10-30 DIAGNOSIS — Z23 IMMUNIZATION DUE: Primary | ICD-10-CM

## 2020-10-30 PROCEDURE — 90694 VACC AIIV4 NO PRSRV 0.5ML IM: CPT | Performed by: NURSE PRACTITIONER

## 2020-10-30 PROCEDURE — G0008 ADMIN INFLUENZA VIRUS VAC: HCPCS | Performed by: NURSE PRACTITIONER

## 2021-02-08 ENCOUNTER — IMMUNIZATION (OUTPATIENT)
Dept: VACCINE CLINIC | Facility: HOSPITAL | Age: 71
End: 2021-02-08

## 2021-02-08 PROCEDURE — 0001A: CPT | Performed by: INTERNAL MEDICINE

## 2021-02-08 PROCEDURE — 91300 HC SARSCOV02 VAC 30MCG/0.3ML IM: CPT | Performed by: INTERNAL MEDICINE

## 2021-03-01 ENCOUNTER — IMMUNIZATION (OUTPATIENT)
Dept: VACCINE CLINIC | Facility: HOSPITAL | Age: 71
End: 2021-03-01

## 2021-03-01 PROCEDURE — 0002A: CPT | Performed by: INTERNAL MEDICINE

## 2021-03-01 PROCEDURE — 91300 HC SARSCOV02 VAC 30MCG/0.3ML IM: CPT | Performed by: INTERNAL MEDICINE

## 2022-06-20 ENCOUNTER — LAB (OUTPATIENT)
Dept: LAB | Facility: HOSPITAL | Age: 72
End: 2022-06-20

## 2022-06-20 ENCOUNTER — OFFICE VISIT (OUTPATIENT)
Dept: FAMILY MEDICINE CLINIC | Facility: CLINIC | Age: 72
End: 2022-06-20

## 2022-06-20 VITALS
HEIGHT: 69 IN | TEMPERATURE: 97.8 F | BODY MASS INDEX: 24.65 KG/M2 | SYSTOLIC BLOOD PRESSURE: 131 MMHG | DIASTOLIC BLOOD PRESSURE: 82 MMHG | WEIGHT: 166.4 LBS | HEART RATE: 72 BPM | OXYGEN SATURATION: 99 %

## 2022-06-20 DIAGNOSIS — Z71.89 ADVANCED CARE PLANNING/COUNSELING DISCUSSION: ICD-10-CM

## 2022-06-20 DIAGNOSIS — Z00.00 ANNUAL PHYSICAL EXAM: Primary | ICD-10-CM

## 2022-06-20 DIAGNOSIS — R13.12 OROPHARYNGEAL DYSPHAGIA: ICD-10-CM

## 2022-06-20 DIAGNOSIS — Z00.00 ANNUAL PHYSICAL EXAM: ICD-10-CM

## 2022-06-20 PROCEDURE — 80061 LIPID PANEL: CPT

## 2022-06-20 PROCEDURE — 36415 COLL VENOUS BLD VENIPUNCTURE: CPT

## 2022-06-20 PROCEDURE — 85025 COMPLETE CBC W/AUTO DIFF WBC: CPT

## 2022-06-20 PROCEDURE — 99213 OFFICE O/P EST LOW 20 MIN: CPT | Performed by: FAMILY MEDICINE

## 2022-06-20 PROCEDURE — 80053 COMPREHEN METABOLIC PANEL: CPT

## 2022-06-20 NOTE — PROGRESS NOTES
Subjective   Jose Luis Ahuja is a 72 y.o. male.   Pt presents today with CC of Hyperlipidemia      History of Present Illness   Patient is here to follow-up for annual physical.  He is due for blood work.  He is fasting today and is agreeable to have this done.  His only concern is food and pills occasionally, once a month, getting caught behind his time in his upper neck.  He states that he is able to cough it back up without difficulty or without shortness of breath or choking.  He states it goes back down with water at that time.  He states this is been going on for 20 years.  It has not changed.  However, he has had a nephew who was diagnosed with esophageal cancer.  He would like to discuss this.             The following portions of the patient's history were reviewed and updated as appropriate: allergies, current medications, past family history, past medical history, past social history, past surgical history and problem list.    Review of Systems   Constitutional: Negative for chills, fever and unexpected weight loss.   HENT: Negative for congestion and sore throat.    Eyes: Negative for blurred vision and visual disturbance.   Respiratory: Negative for cough and wheezing.    Cardiovascular: Negative for chest pain and palpitations.   Gastrointestinal: Negative for abdominal pain and diarrhea.   Endocrine: Negative for cold intolerance and heat intolerance.   Genitourinary: Negative for dysuria.   Musculoskeletal: Negative for arthralgias and neck stiffness.   Neurological: Negative for dizziness, seizures and syncope.   Psychiatric/Behavioral: Negative for self-injury, suicidal ideas and depressed mood.       Objective   Physical Exam  Vitals and nursing note reviewed.   Constitutional:       Appearance: He is well-developed.   HENT:      Head: Normocephalic and atraumatic.      Right Ear: External ear normal.      Left Ear: External ear normal.      Nose: Nose normal.   Eyes:      Conjunctiva/sclera:  Conjunctivae normal.      Pupils: Pupils are equal, round, and reactive to light.   Cardiovascular:      Rate and Rhythm: Normal rate and regular rhythm.      Heart sounds: Normal heart sounds.   Pulmonary:      Effort: Pulmonary effort is normal.      Breath sounds: Normal breath sounds.   Abdominal:      General: Bowel sounds are normal.      Palpations: Abdomen is soft.   Musculoskeletal:      Cervical back: Normal range of motion and neck supple.   Skin:     General: Skin is warm and dry.   Neurological:      Mental Status: He is alert and oriented to person, place, and time.   Psychiatric:         Behavior: Behavior normal.           Assessment & Plan   Diagnoses and all orders for this visit:    1. Annual physical exam (Primary)  -     Comprehensive Metabolic Panel; Future  -     CBC Auto Differential; Future  -     Lipid Panel; Future  Patient Counseling:  --Nutrition: Stressed importance of moderation in sodium/caffeine intake, saturated fat and cholesterol, caloric balance, sufficient intake of fresh fruits, vegetables, fiber, calcium, iron, and 1 mg of folate supplement per day (for females capable of pregnancy).  --Discussed the issue of estrogen replacement, calcium supplement, and the daily use of baby aspirin.  --Exercise: Stressed the importance of regular exercise.   --Substance Abuse: Discussed cessation/primary prevention of tobacco, alcohol, or other drug use; driving or other dangerous activities under the influence; availability of treatment for abuse.    --Sexuality: Discussed sexually transmitted diseases, partner selection, use of condoms, avoidance of unintended pregnancy  and contraceptive alternatives.   --Injury prevention: Discussed safety belts, safety helmets, smoke detector, smoking near bedding or upholstery.   --Dental health: Discussed importance of regular tooth brushing, flossing, and dental visits.  --Immunizations reviewed.  --Discussed benefits of screening  colonoscopy.  --After hours service discussed with patient    2. Oropharyngeal dysphagia  Patient is a 72-year-old male that reports that over the past 20 years, he occasionally has pills, and larger food that gets caught just below his tongue, reportedly.  He states he is able to cough it back up without difficulty.  He reports that he has a nephew that was recently diagnosed with esophageal cancer.  Despite this, he states that because his symptoms have been stable, he does not want further evaluation.  If he desires, recommend evaluation with EGD rather than ENT referal.  He is can call if he would like to have this done.  3. Advanced care planning/counseling discussion  He reports that he does have an advanced directive, he has not updated it recently.  He is going updated and follow-up with us.  Recommend Medicare wellness in 1 year, at that time, if not done already, we will update his advance directive.              BMI is within normal parameters. No other follow-up for BMI required.

## 2022-06-21 LAB
ALBUMIN SERPL-MCNC: 4.2 G/DL (ref 3.5–5.2)
ALBUMIN/GLOB SERPL: 1.8 G/DL
ALP SERPL-CCNC: 58 U/L (ref 39–117)
ALT SERPL W P-5'-P-CCNC: 23 U/L (ref 1–41)
ANION GAP SERPL CALCULATED.3IONS-SCNC: 12.1 MMOL/L (ref 5–15)
AST SERPL-CCNC: 23 U/L (ref 1–40)
BASOPHILS # BLD AUTO: 0.02 10*3/MM3 (ref 0–0.2)
BASOPHILS NFR BLD AUTO: 0.3 % (ref 0–1.5)
BILIRUB SERPL-MCNC: 0.5 MG/DL (ref 0–1.2)
BUN SERPL-MCNC: 12 MG/DL (ref 8–23)
BUN/CREAT SERPL: 11.7 (ref 7–25)
CALCIUM SPEC-SCNC: 9.6 MG/DL (ref 8.6–10.5)
CHLORIDE SERPL-SCNC: 103 MMOL/L (ref 98–107)
CHOLEST SERPL-MCNC: 230 MG/DL (ref 0–200)
CO2 SERPL-SCNC: 24.9 MMOL/L (ref 22–29)
CREAT SERPL-MCNC: 1.03 MG/DL (ref 0.76–1.27)
DEPRECATED RDW RBC AUTO: 40.5 FL (ref 37–54)
EGFRCR SERPLBLD CKD-EPI 2021: 76.7 ML/MIN/1.73
EOSINOPHIL # BLD AUTO: 0.26 10*3/MM3 (ref 0–0.4)
EOSINOPHIL NFR BLD AUTO: 3.9 % (ref 0.3–6.2)
ERYTHROCYTE [DISTWIDTH] IN BLOOD BY AUTOMATED COUNT: 12.5 % (ref 12.3–15.4)
GLOBULIN UR ELPH-MCNC: 2.4 GM/DL
GLUCOSE SERPL-MCNC: 84 MG/DL (ref 65–99)
HCT VFR BLD AUTO: 42.9 % (ref 37.5–51)
HDLC SERPL-MCNC: 52 MG/DL (ref 40–60)
HGB BLD-MCNC: 15 G/DL (ref 13–17.7)
IMM GRANULOCYTES # BLD AUTO: 0.02 10*3/MM3 (ref 0–0.05)
IMM GRANULOCYTES NFR BLD AUTO: 0.3 % (ref 0–0.5)
LDLC SERPL CALC-MCNC: 156 MG/DL (ref 0–100)
LDLC/HDLC SERPL: 2.95 {RATIO}
LYMPHOCYTES # BLD AUTO: 1.78 10*3/MM3 (ref 0.7–3.1)
LYMPHOCYTES NFR BLD AUTO: 26.8 % (ref 19.6–45.3)
MCH RBC QN AUTO: 31.3 PG (ref 26.6–33)
MCHC RBC AUTO-ENTMCNC: 35 G/DL (ref 31.5–35.7)
MCV RBC AUTO: 89.6 FL (ref 79–97)
MONOCYTES # BLD AUTO: 0.52 10*3/MM3 (ref 0.1–0.9)
MONOCYTES NFR BLD AUTO: 7.8 % (ref 5–12)
NEUTROPHILS NFR BLD AUTO: 4.04 10*3/MM3 (ref 1.7–7)
NEUTROPHILS NFR BLD AUTO: 60.9 % (ref 42.7–76)
NRBC BLD AUTO-RTO: 0 /100 WBC (ref 0–0.2)
PLATELET # BLD AUTO: 246 10*3/MM3 (ref 140–450)
PMV BLD AUTO: 10.9 FL (ref 6–12)
POTASSIUM SERPL-SCNC: 4.6 MMOL/L (ref 3.5–5.2)
PROT SERPL-MCNC: 6.6 G/DL (ref 6–8.5)
RBC # BLD AUTO: 4.79 10*6/MM3 (ref 4.14–5.8)
SODIUM SERPL-SCNC: 140 MMOL/L (ref 136–145)
TRIGL SERPL-MCNC: 122 MG/DL (ref 0–150)
VLDLC SERPL-MCNC: 22 MG/DL (ref 5–40)
WBC NRBC COR # BLD: 6.64 10*3/MM3 (ref 3.4–10.8)

## 2022-06-22 ENCOUNTER — TELEPHONE (OUTPATIENT)
Dept: FAMILY MEDICINE CLINIC | Facility: CLINIC | Age: 72
End: 2022-06-22

## 2022-06-22 NOTE — TELEPHONE ENCOUNTER
----- Message from Sekou Mcmanus DO sent at 6/22/2022 11:18 AM EDT -----  No problems on your blood work.      Left a message to return call.    Patient notified.

## 2022-08-29 ENCOUNTER — OFFICE VISIT (OUTPATIENT)
Dept: FAMILY MEDICINE CLINIC | Facility: CLINIC | Age: 72
End: 2022-08-29

## 2022-08-29 VITALS
OXYGEN SATURATION: 97 % | BODY MASS INDEX: 24.05 KG/M2 | HEART RATE: 78 BPM | HEIGHT: 69 IN | SYSTOLIC BLOOD PRESSURE: 134 MMHG | WEIGHT: 162.4 LBS | TEMPERATURE: 97.8 F | DIASTOLIC BLOOD PRESSURE: 78 MMHG

## 2022-08-29 DIAGNOSIS — E78.2 MIXED HYPERLIPIDEMIA: ICD-10-CM

## 2022-08-29 DIAGNOSIS — G45.3 AMAUROSIS FUGAX OF LEFT EYE: Primary | ICD-10-CM

## 2022-08-29 PROCEDURE — 99214 OFFICE O/P EST MOD 30 MIN: CPT | Performed by: FAMILY MEDICINE

## 2022-08-29 RX ORDER — ATORVASTATIN CALCIUM 40 MG/1
40 TABLET, FILM COATED ORAL NIGHTLY
Qty: 90 TABLET | Refills: 0 | Status: SHIPPED | OUTPATIENT
Start: 2022-08-29

## 2022-08-29 RX ORDER — VITAMIN E 268 MG
400 CAPSULE ORAL DAILY
COMMUNITY

## 2022-08-29 NOTE — PROGRESS NOTES
Subjective   Jose Luis Ahuja is a 72 y.o. male.   Pt presents today with CC of Loss of Vision (Left eye)      History of Present Illness   Patient is a 72-year-old male that reports that 1 week ago he was on the couch watching television, suddenly he lost complete vision in his left eye.  It 100% resolved after about a minute.  He denies any other symptoms, no headaches, no other concerns.  He states that the following day he was out mowing grass and had similar symptoms again.  He denies any other symptoms.  He reports that he feels fine since.  He does follow with optometry.  He has known hyperlipidemia, he takes omega-3 fatty acids and garlic.  He has been resistant to statins in the past.  He has history of GI bleed with aspirin, he is resistant to using aspirin.           The following portions of the patient's history were reviewed and updated as appropriate: allergies, current medications, past family history, past medical history, past social history, past surgical history and problem list.    Review of Systems   Constitutional: Negative for chills, fever and unexpected weight loss.   HENT: Negative for congestion and sore throat.    Eyes: Positive for visual disturbance. Negative for blurred vision, double vision, photophobia, pain, discharge and itching.   Respiratory: Negative for cough and wheezing.    Cardiovascular: Negative for chest pain and palpitations.   Gastrointestinal: Negative for abdominal pain and diarrhea.   Endocrine: Negative for cold intolerance and heat intolerance.   Genitourinary: Negative for dysuria.   Musculoskeletal: Negative for arthralgias and neck stiffness.   Neurological: Negative for dizziness, seizures and syncope.   Psychiatric/Behavioral: Negative for self-injury, suicidal ideas and depressed mood.       Objective   Physical Exam  Vitals and nursing note reviewed.   Constitutional:       Appearance: He is well-developed.   HENT:      Head: Normocephalic and atraumatic.       Right Ear: External ear normal.      Left Ear: External ear normal.      Nose: Nose normal.   Eyes:      Conjunctiva/sclera: Conjunctivae normal.      Pupils: Pupils are equal, round, and reactive to light.   Neck:      Vascular: No carotid bruit.   Cardiovascular:      Rate and Rhythm: Normal rate and regular rhythm.      Heart sounds: Normal heart sounds.   Pulmonary:      Effort: Pulmonary effort is normal.      Breath sounds: Normal breath sounds.   Abdominal:      General: Bowel sounds are normal.      Palpations: Abdomen is soft.   Musculoskeletal:      Cervical back: Normal range of motion and neck supple.   Skin:     General: Skin is warm and dry.   Neurological:      General: No focal deficit present.      Mental Status: He is alert and oriented to person, place, and time. Mental status is at baseline.      Cranial Nerves: No cranial nerve deficit.      Sensory: No sensory deficit.      Motor: No weakness.      Coordination: Coordination normal.      Gait: Gait normal.      Comments: Cranial nerves grossly intact 2 through 12, normal neurologic examination.   Psychiatric:         Behavior: Behavior normal.           Assessment & Plan   Diagnoses and all orders for this visit:    1. Amaurosis fugax of left eye (Primary)  -     Comprehensive Metabolic Panel; Future  -     CBC Auto Differential; Future  -     Lipid Panel; Future  -     Ambulatory Referral to Ophthalmology  -     atorvastatin (LIPITOR) 40 MG tablet; Take 1 tablet by mouth Every Night.  Dispense: 90 tablet; Refill: 0  -     CT angiogram neck w wo contrast; Future  -     CT angiogram head w contrast; Future  Exam is normal today.  I discussed with him the potential diagnoses.  I recommend he have his eye examined by ophthalmology.  We will get a CT angiogram of his head and neck, will get repeat blood work today.  He is fasting.  Recommend starting atorvastatin 40 mg nightly.  2. Mixed hyperlipidemia  -     Lipid Panel; Future  -      atorvastatin (LIPITOR) 40 MG tablet; Take 1 tablet by mouth Every Night.  Dispense: 90 tablet; Refill: 0      He is going to follow-up after his imaging.  If he has recurrence of symptoms, he is going to go to the emergency room.            BMI is within normal parameters. No other follow-up for BMI required.

## 2022-08-30 ENCOUNTER — LAB (OUTPATIENT)
Dept: FAMILY MEDICINE CLINIC | Facility: CLINIC | Age: 72
End: 2022-08-30

## 2022-08-30 DIAGNOSIS — E78.2 MIXED HYPERLIPIDEMIA: ICD-10-CM

## 2022-08-30 DIAGNOSIS — G45.3 AMAUROSIS FUGAX OF LEFT EYE: ICD-10-CM

## 2022-08-30 PROCEDURE — 80053 COMPREHEN METABOLIC PANEL: CPT | Performed by: FAMILY MEDICINE

## 2022-08-30 PROCEDURE — 85025 COMPLETE CBC W/AUTO DIFF WBC: CPT | Performed by: FAMILY MEDICINE

## 2022-08-30 PROCEDURE — 80061 LIPID PANEL: CPT | Performed by: FAMILY MEDICINE

## 2022-08-31 ENCOUNTER — TELEPHONE (OUTPATIENT)
Dept: FAMILY MEDICINE CLINIC | Facility: CLINIC | Age: 72
End: 2022-08-31

## 2022-08-31 LAB
ALBUMIN SERPL-MCNC: 4.3 G/DL (ref 3.5–5.2)
ALBUMIN/GLOB SERPL: 2 G/DL
ALP SERPL-CCNC: 49 U/L (ref 39–117)
ALT SERPL W P-5'-P-CCNC: 16 U/L (ref 1–41)
ANION GAP SERPL CALCULATED.3IONS-SCNC: 9 MMOL/L (ref 5–15)
AST SERPL-CCNC: 19 U/L (ref 1–40)
BASOPHILS # BLD AUTO: 0.03 10*3/MM3 (ref 0–0.2)
BASOPHILS NFR BLD AUTO: 0.4 % (ref 0–1.5)
BILIRUB SERPL-MCNC: 0.5 MG/DL (ref 0–1.2)
BUN SERPL-MCNC: 11 MG/DL (ref 8–23)
BUN/CREAT SERPL: 10.5 (ref 7–25)
CALCIUM SPEC-SCNC: 9.1 MG/DL (ref 8.6–10.5)
CHLORIDE SERPL-SCNC: 104 MMOL/L (ref 98–107)
CHOLEST SERPL-MCNC: 215 MG/DL (ref 0–200)
CO2 SERPL-SCNC: 26 MMOL/L (ref 22–29)
CREAT SERPL-MCNC: 1.05 MG/DL (ref 0.76–1.27)
DEPRECATED RDW RBC AUTO: 41.1 FL (ref 37–54)
EGFRCR SERPLBLD CKD-EPI 2021: 75 ML/MIN/1.73
EOSINOPHIL # BLD AUTO: 0.25 10*3/MM3 (ref 0–0.4)
EOSINOPHIL NFR BLD AUTO: 3.7 % (ref 0.3–6.2)
ERYTHROCYTE [DISTWIDTH] IN BLOOD BY AUTOMATED COUNT: 12.8 % (ref 12.3–15.4)
GLOBULIN UR ELPH-MCNC: 2.2 GM/DL
GLUCOSE SERPL-MCNC: 80 MG/DL (ref 65–99)
HCT VFR BLD AUTO: 45.8 % (ref 37.5–51)
HDLC SERPL-MCNC: 45 MG/DL (ref 40–60)
HGB BLD-MCNC: 15.5 G/DL (ref 13–17.7)
IMM GRANULOCYTES # BLD AUTO: 0.01 10*3/MM3 (ref 0–0.05)
IMM GRANULOCYTES NFR BLD AUTO: 0.1 % (ref 0–0.5)
LDLC SERPL CALC-MCNC: 146 MG/DL (ref 0–100)
LDLC/HDLC SERPL: 3.18 {RATIO}
LYMPHOCYTES # BLD AUTO: 2.22 10*3/MM3 (ref 0.7–3.1)
LYMPHOCYTES NFR BLD AUTO: 32.7 % (ref 19.6–45.3)
MCH RBC QN AUTO: 30.4 PG (ref 26.6–33)
MCHC RBC AUTO-ENTMCNC: 33.8 G/DL (ref 31.5–35.7)
MCV RBC AUTO: 89.8 FL (ref 79–97)
MONOCYTES # BLD AUTO: 0.55 10*3/MM3 (ref 0.1–0.9)
MONOCYTES NFR BLD AUTO: 8.1 % (ref 5–12)
NEUTROPHILS NFR BLD AUTO: 3.73 10*3/MM3 (ref 1.7–7)
NEUTROPHILS NFR BLD AUTO: 55 % (ref 42.7–76)
NRBC BLD AUTO-RTO: 0 /100 WBC (ref 0–0.2)
PLATELET # BLD AUTO: 262 10*3/MM3 (ref 140–450)
PMV BLD AUTO: 11 FL (ref 6–12)
POTASSIUM SERPL-SCNC: 4.4 MMOL/L (ref 3.5–5.2)
PROT SERPL-MCNC: 6.5 G/DL (ref 6–8.5)
RBC # BLD AUTO: 5.1 10*6/MM3 (ref 4.14–5.8)
SODIUM SERPL-SCNC: 139 MMOL/L (ref 136–145)
TRIGL SERPL-MCNC: 135 MG/DL (ref 0–150)
VLDLC SERPL-MCNC: 24 MG/DL (ref 5–40)
WBC NRBC COR # BLD: 6.79 10*3/MM3 (ref 3.4–10.8)

## 2022-09-01 NOTE — TELEPHONE ENCOUNTER
Patient returned call and indicated he has had no further changes in his vision.  He has an appointment scheduled with Dr Mcmillan in October.

## 2022-09-30 ENCOUNTER — HOSPITAL ENCOUNTER (OUTPATIENT)
Dept: CT IMAGING | Facility: HOSPITAL | Age: 72
Discharge: HOME | End: 2022-09-30

## 2022-09-30 DIAGNOSIS — G45.3 AMAUROSIS FUGAX OF LEFT EYE: ICD-10-CM

## 2022-09-30 LAB — CREAT BLDA-MCNC: 1 MG/DL (ref 0.6–1.3)

## 2022-09-30 PROCEDURE — 25010000002 IOPAMIDOL 61 % SOLUTION: Performed by: FAMILY MEDICINE

## 2022-09-30 PROCEDURE — 70496 CT ANGIOGRAPHY HEAD: CPT

## 2022-09-30 PROCEDURE — 70498 CT ANGIOGRAPHY NECK: CPT

## 2022-09-30 PROCEDURE — 70498 CT ANGIOGRAPHY NECK: CPT | Performed by: RADIOLOGY

## 2022-09-30 PROCEDURE — 82565 ASSAY OF CREATININE: CPT

## 2022-09-30 PROCEDURE — 70496 CT ANGIOGRAPHY HEAD: CPT | Performed by: RADIOLOGY

## 2022-09-30 RX ADMIN — IOPAMIDOL 80 ML: 612 INJECTION, SOLUTION INTRAVENOUS at 13:31

## 2022-10-04 ENCOUNTER — TELEPHONE (OUTPATIENT)
Dept: FAMILY MEDICINE CLINIC | Facility: CLINIC | Age: 72
End: 2022-10-04

## 2022-10-04 NOTE — TELEPHONE ENCOUNTER
----- Message from Sekou Mcmanus DO sent at 10/4/2022  1:34 PM EDT -----  No problems on your angiogram.  Have you had any further problems with your vision?      Spoke with patient & he verbalized understanding,he has had no further problems.

## 2023-07-20 ENCOUNTER — OFFICE VISIT (OUTPATIENT)
Dept: CARDIOLOGY | Facility: CLINIC | Age: 73
End: 2023-07-20
Payer: MEDICARE

## 2023-07-20 VITALS
DIASTOLIC BLOOD PRESSURE: 75 MMHG | HEART RATE: 64 BPM | OXYGEN SATURATION: 97 % | HEIGHT: 69 IN | RESPIRATION RATE: 16 BRPM | WEIGHT: 156 LBS | SYSTOLIC BLOOD PRESSURE: 126 MMHG | BODY MASS INDEX: 23.11 KG/M2

## 2023-07-20 DIAGNOSIS — Z82.49 FAMILY HISTORY OF PREMATURE CORONARY ARTERY DISEASE: ICD-10-CM

## 2023-07-20 DIAGNOSIS — R06.09 DYSPNEA ON EXERTION: ICD-10-CM

## 2023-07-20 DIAGNOSIS — R07.2 PRECORDIAL PAIN: Primary | ICD-10-CM

## 2023-07-20 PROCEDURE — 93000 ELECTROCARDIOGRAM COMPLETE: CPT | Performed by: INTERNAL MEDICINE

## 2023-07-20 PROCEDURE — 99204 OFFICE O/P NEW MOD 45 MIN: CPT | Performed by: INTERNAL MEDICINE

## 2023-07-20 NOTE — PROGRESS NOTES
Sekou Mcmanus DO  Jose Luis Ahuja  1950 07/20/2023    Patient Active Problem List   Diagnosis    Other hyperlipidemia    Elevated blood pressure reading    Vitamin D deficiency disease       Dear Sekou Mcmanus DO:    Subjective     Jose Luis Ahuja is a 73 y.o. male with the problems as listed above, presents    Chief complaint: Recurrent chest pains.    History of Present Illness: Mr. Ahuja is a pleasant 73-year-old gentleman with no history of known heart disease or coronary artery disease, nondiabetic and nonhypertensive and non-smoker but has positive family history of premature coronary artery disease, presents with complaints of having recurrent chest pains on and off for the last few years.  He has a chest pains mostly when he exerts such as walking uphill or cycling and resolves with rest.  This is felt as tightness in the substernal region and associated with some shortness of breath but no sweating.  He has dyspnea with moderate exertion with no PND, orthopnea pedal edema.  He has some intermittent palpitations with skipped heartbeats with no associated dizziness or syncope.    Cardiac risk factors:Positive family Hx. of premature athersclerotivc disease. and age and male gender.    No Known Allergies:    Current Outpatient Medications:     cholecalciferol (VITAMIN D3) 400 units tablet, Take 1 tablet by mouth Daily., Disp: , Rfl:     GARLIC OIL PO, Take  by mouth., Disp: , Rfl:     Omega-3 Fatty Acids (FISH OIL) 1000 MG capsule capsule, Take 1,200 mg by mouth Daily With Breakfast., Disp: , Rfl:     vitamin E 400 UNIT capsule, Take 1 capsule by mouth Daily., Disp: , Rfl:     Past Medical History:   Diagnosis Date    Colon polyps     Hyperlipidemia     Skin cancer     Vitamin D deficiency      Past Surgical History:   Procedure Laterality Date    COLONOSCOPY  06/14/2019    Dr. Martines     EYE SURGERY      SKIN CANCER EXCISION      Mult.      Family History   Problem Relation Age of Onset     "Heart disease Mother     Heart failure Mother     Liver cancer Father     Heart attack Father      Social History     Tobacco Use    Smoking status: Never    Smokeless tobacco: Never   Vaping Use    Vaping Use: Never used   Substance Use Topics    Alcohol use: No    Drug use: No     Review of Systems   Constitutional: Negative for chills, fever, malaise/fatigue, weight gain and weight loss.   HENT:  Positive for hearing loss. Negative for congestion and nosebleeds.    Eyes:         Glaucoma   Cardiovascular:  Positive for chest pain and palpitations. Negative for irregular heartbeat, leg swelling and orthopnea.   Respiratory:  Negative for cough, hemoptysis and shortness of breath.    Endocrine: Negative.    Musculoskeletal:  Positive for muscle cramps and myalgias.   Gastrointestinal:  Negative for abdominal pain, change in bowel habit, hematemesis, melena and vomiting.   Genitourinary:  Positive for frequency. Negative for dysuria and hematuria.   Neurological:  Negative for focal weakness, headaches, light-headedness and weakness.   Psychiatric/Behavioral: Negative.     Allergic/Immunologic: Negative.    Objective   Blood pressure 126/75, pulse 64, resp. rate 16, height 175.3 cm (69\"), weight 70.8 kg (156 lb), SpO2 97 %.  Body mass index is 23.04 kg/m².    Vitals reviewed.   Constitutional:       Appearance: Well-developed.   Eyes:      Conjunctiva/sclera: Conjunctivae normal.   HENT:      Head: Normocephalic.   Neck:      Thyroid: No thyromegaly.      Vascular: No JVD.      Trachea: No tracheal deviation.   Pulmonary:      Effort: No respiratory distress.      Breath sounds: Normal breath sounds. No wheezing. No rales.   Cardiovascular:      PMI at left midclavicular line. Normal rate. Regular rhythm. Normal S1. Normal S2.       Murmurs: There is no murmur.      No gallop.  No click. No rub.   Pulses:     Intact distal pulses.   Edema:     Peripheral edema absent.   Abdominal:      General: Bowel sounds are " normal.      Palpations: Abdomen is soft. There is no abdominal mass.      Tenderness: There is no abdominal tenderness.   Musculoskeletal:      Cervical back: Normal range of motion and neck supple. Skin:     General: Skin is warm and dry.   Neurological:      Mental Status: Alert and oriented to person, place, and time.      Cranial Nerves: No cranial nerve deficit.       Lab Results   Component Value Date     06/20/2023    K 4.6 06/20/2023     06/20/2023    CO2 27.4 06/20/2023    BUN 12 06/20/2023    CREATININE 0.92 06/20/2023    GLUCOSE 103 (H) 06/20/2023    CALCIUM 9.3 06/20/2023    AST 25 06/20/2023    ALT 32 06/20/2023    ALKPHOS 55 06/20/2023     No results found for: CKTOTAL  Lab Results   Component Value Date    WBC 6.77 06/20/2023    HGB 14.8 06/20/2023    HCT 44.8 06/20/2023     06/20/2023     No results found for: INR  Lab Results   Component Value Date    MG 2.3 03/03/2020     Lab Results   Component Value Date    TSH 1.610 03/03/2020    PSA 2.370 03/03/2020    TRIG 129 06/20/2023    HDL 48 06/20/2023     (H) 06/20/2023        ECG 12 Lead    Date/Time: 7/20/2023 1:40 PM  Performed by: Regino Vazquez MD  Authorized by: Regino Vazquez MD   Previous ECG: no previous ECG available  Rhythm: sinus rhythm  Ectopy: infrequent PVCs  ST Segments: ST segments normal  T Waves: T waves normal          Assessment & Plan    Diagnosis Plan   1. Precordial pain  Stress Test With Myocardial Perfusion (1 Day)      2. Dyspnea on exertion        3. Family history of premature coronary artery disease            Recommendations  Orders Placed This Encounter   Procedures    Stress Test With Myocardial Perfusion (1 Day)    ECG 12 Lead      Add enteric-coated aspirin 81 mg daily.  Evaluate his chest pains further with a stress sestamibi study.    Return today (on 7/20/2023) for or sooner if needed.    As always, Sekou Mcmanus, DO  I appreciate very much the opportunity to participate in the  cardiovascular care of your patients. Please do not hesitate to call me with any questions with regards to Jose Luis Ahuja's evaluation and management.     With Best Regards,        Regino Vazquez MD, FACC    Please note that portions of this note were completed with a voice recognition program.

## 2023-07-20 NOTE — LETTER
July 25, 2023     Sekou Mcmanus DO  [address] [place] [address]    Patient: Jose Luis Ahuja   YOB: 1950   Date of Visit: 7/20/2023     Dear Sekou Mcmanus DO:       Thank you for referring Jose Luis Ahuja to me for evaluation. Below are the relevant portions of my assessment and plan of care.    If you have questions, please do not hesitate to call me. I look forward to following Jose Luis along with you.         Sincerely,        Regino Vazquez MD        CC: No Recipients    Regino Vazquez MD  07/25/23 1901  Sign when Signing Visit  Sekou Mcmanus DO  Jose Luis Ahuja  1950 07/20/2023    Patient Active Problem List   Diagnosis   • Other hyperlipidemia   • Elevated blood pressure reading   • Vitamin D deficiency disease       Dear Sekou Mcmanus DO:    Subjective    Jose Luis Ahuja is a 73 y.o. male with the problems as listed above, presents    Chief complaint: Recurrent chest pains.    History of Present Illness: Mr. Ahuja is a pleasant 73-year-old gentleman with no history of known heart disease or coronary artery disease, nondiabetic and nonhypertensive and non-smoker but has positive family history of premature coronary artery disease, presents with complaints of having recurrent chest pains on and off for the last few years.  He has a chest pains mostly when he exerts such as walking uphill or cycling and resolves with rest.  This is felt as tightness in the substernal region and associated with some shortness of breath but no sweating.  He has dyspnea with moderate exertion with no PND, orthopnea pedal edema.  He has some intermittent palpitations with skipped heartbeats with no associated dizziness or syncope.    Cardiac risk factors:Positive family Hx. of premature athersclerotivc disease. and age and male gender.    No Known Allergies:    Current Outpatient Medications:   •  cholecalciferol (VITAMIN D3) 400 units tablet, Take 1 tablet by mouth Daily., Disp: , Rfl:  "  •  GARLIC OIL PO, Take  by mouth., Disp: , Rfl:   •  Omega-3 Fatty Acids (FISH OIL) 1000 MG capsule capsule, Take 1,200 mg by mouth Daily With Breakfast., Disp: , Rfl:   •  vitamin E 400 UNIT capsule, Take 1 capsule by mouth Daily., Disp: , Rfl:     Past Medical History:   Diagnosis Date   • Colon polyps    • Hyperlipidemia    • Skin cancer    • Vitamin D deficiency      Past Surgical History:   Procedure Laterality Date   • COLONOSCOPY  06/14/2019    Dr. Martines    • EYE SURGERY     • SKIN CANCER EXCISION      Mult.      Family History   Problem Relation Age of Onset   • Heart disease Mother    • Heart failure Mother    • Liver cancer Father    • Heart attack Father      Social History     Tobacco Use   • Smoking status: Never   • Smokeless tobacco: Never   Vaping Use   • Vaping Use: Never used   Substance Use Topics   • Alcohol use: No   • Drug use: No     Review of Systems   Constitutional: Negative for chills, fever, malaise/fatigue, weight gain and weight loss.   HENT:  Positive for hearing loss. Negative for congestion and nosebleeds.    Eyes:         Glaucoma   Cardiovascular:  Positive for chest pain and palpitations. Negative for irregular heartbeat, leg swelling and orthopnea.   Respiratory:  Negative for cough, hemoptysis and shortness of breath.    Endocrine: Negative.    Musculoskeletal:  Positive for muscle cramps and myalgias.   Gastrointestinal:  Negative for abdominal pain, change in bowel habit, hematemesis, melena and vomiting.   Genitourinary:  Positive for frequency. Negative for dysuria and hematuria.   Neurological:  Negative for focal weakness, headaches, light-headedness and weakness.   Psychiatric/Behavioral: Negative.     Allergic/Immunologic: Negative.    Objective  Blood pressure 126/75, pulse 64, resp. rate 16, height 175.3 cm (69\"), weight 70.8 kg (156 lb), SpO2 97 %.  Body mass index is 23.04 kg/m².    Vitals reviewed.   Constitutional:       Appearance: Well-developed.   Eyes:      " Conjunctiva/sclera: Conjunctivae normal.   HENT:      Head: Normocephalic.   Neck:      Thyroid: No thyromegaly.      Vascular: No JVD.      Trachea: No tracheal deviation.   Pulmonary:      Effort: No respiratory distress.      Breath sounds: Normal breath sounds. No wheezing. No rales.   Cardiovascular:      PMI at left midclavicular line. Normal rate. Regular rhythm. Normal S1. Normal S2.       Murmurs: There is no murmur.      No gallop.  No click. No rub.   Pulses:     Intact distal pulses.   Edema:     Peripheral edema absent.   Abdominal:      General: Bowel sounds are normal.      Palpations: Abdomen is soft. There is no abdominal mass.      Tenderness: There is no abdominal tenderness.   Musculoskeletal:      Cervical back: Normal range of motion and neck supple. Skin:     General: Skin is warm and dry.   Neurological:      Mental Status: Alert and oriented to person, place, and time.      Cranial Nerves: No cranial nerve deficit.       Lab Results   Component Value Date     06/20/2023    K 4.6 06/20/2023     06/20/2023    CO2 27.4 06/20/2023    BUN 12 06/20/2023    CREATININE 0.92 06/20/2023    GLUCOSE 103 (H) 06/20/2023    CALCIUM 9.3 06/20/2023    AST 25 06/20/2023    ALT 32 06/20/2023    ALKPHOS 55 06/20/2023     No results found for: CKTOTAL  Lab Results   Component Value Date    WBC 6.77 06/20/2023    HGB 14.8 06/20/2023    HCT 44.8 06/20/2023     06/20/2023     No results found for: INR  Lab Results   Component Value Date    MG 2.3 03/03/2020     Lab Results   Component Value Date    TSH 1.610 03/03/2020    PSA 2.370 03/03/2020    TRIG 129 06/20/2023    HDL 48 06/20/2023     (H) 06/20/2023        ECG 12 Lead    Date/Time: 7/20/2023 1:40 PM  Performed by: Regino Vazquez MD  Authorized by: Regino Vazquez MD   Previous ECG: no previous ECG available  Rhythm: sinus rhythm  Ectopy: infrequent PVCs  ST Segments: ST segments normal  T Waves: T waves normal          Assessment &  Plan   Diagnosis Plan   1. Precordial pain  Stress Test With Myocardial Perfusion (1 Day)      2. Dyspnea on exertion        3. Family history of premature coronary artery disease            Recommendations  Orders Placed This Encounter   Procedures   • Stress Test With Myocardial Perfusion (1 Day)   • ECG 12 Lead      Add enteric-coated aspirin 81 mg daily.  Evaluate his chest pains further with a stress sestamibi study.    Return today (on 7/20/2023) for or sooner if needed.    As always, Sekou Mcmanus, DO  I appreciate very much the opportunity to participate in the cardiovascular care of your patients. Please do not hesitate to call me with any questions with regards to Jose Luis Ahuja's evaluation and management.     With Best Regards,        Regino Vazquez MD, [facility]    Please note that portions of this note were completed with a voice recognition program.

## 2023-08-18 ENCOUNTER — HOSPITAL ENCOUNTER (OUTPATIENT)
Dept: CARDIOLOGY | Facility: HOSPITAL | Age: 73
Discharge: HOME | End: 2023-08-18
Payer: MEDICARE

## 2023-08-18 ENCOUNTER — HOSPITAL ENCOUNTER (OUTPATIENT)
Dept: NUCLEAR MEDICINE | Facility: HOSPITAL | Age: 73
Discharge: HOME | End: 2023-08-18
Payer: MEDICARE

## 2023-08-18 DIAGNOSIS — R07.2 PRECORDIAL PAIN: ICD-10-CM

## 2023-08-18 LAB
BH CV NUCLEAR PRIOR STUDY: 3
BH CV REST NUCLEAR ISOTOPE DOSE: 10.2 MCI
BH CV STRESS BP STAGE 1: NORMAL
BH CV STRESS BP STAGE 2: NORMAL
BH CV STRESS DURATION MIN STAGE 1: 3
BH CV STRESS DURATION MIN STAGE 2: 3
BH CV STRESS DURATION MIN STAGE 3: 1
BH CV STRESS DURATION SEC STAGE 1: 0
BH CV STRESS DURATION SEC STAGE 2: 0
BH CV STRESS DURATION SEC STAGE 3: 30
BH CV STRESS GRADE STAGE 1: 10
BH CV STRESS GRADE STAGE 2: 12
BH CV STRESS GRADE STAGE 3: 14
BH CV STRESS HR STAGE 1: 98
BH CV STRESS HR STAGE 2: 118
BH CV STRESS HR STAGE 3: 126
BH CV STRESS METS STAGE 1: 5
BH CV STRESS METS STAGE 2: 7.5
BH CV STRESS METS STAGE 3: 10
BH CV STRESS NUCLEAR ISOTOPE DOSE: 31.6 MCI
BH CV STRESS PROTOCOL 1: NORMAL
BH CV STRESS RECOVERY BP: NORMAL MMHG
BH CV STRESS RECOVERY HR: 86 BPM
BH CV STRESS SPEED STAGE 1: 1.7
BH CV STRESS SPEED STAGE 2: 2.5
BH CV STRESS SPEED STAGE 3: 3.4
BH CV STRESS STAGE 1: 1
BH CV STRESS STAGE 2: 2
BH CV STRESS STAGE 3: 3
LV EF NUC BP: 63 %
MAXIMAL PREDICTED HEART RATE: 146 BPM
PERCENT MAX PREDICTED HR: 86.3 %
STRESS BASELINE BP: NORMAL MMHG
STRESS BASELINE HR: 73 BPM
STRESS PERCENT HR: 102 %
STRESS POST PEAK BP: NORMAL MMHG
STRESS POST PEAK HR: 126 BPM
STRESS TARGET HR: 124 BPM

## 2023-08-18 PROCEDURE — A9500 TC99M SESTAMIBI: HCPCS | Performed by: INTERNAL MEDICINE

## 2023-08-18 PROCEDURE — 78452 HT MUSCLE IMAGE SPECT MULT: CPT

## 2023-08-18 PROCEDURE — 0 TECHNETIUM SESTAMIBI: Performed by: INTERNAL MEDICINE

## 2023-08-18 PROCEDURE — 93017 CV STRESS TEST TRACING ONLY: CPT

## 2023-08-18 RX ADMIN — TECHNETIUM TC 99M SESTAMIBI 1 DOSE: 1 INJECTION INTRAVENOUS at 09:34

## 2023-08-18 RX ADMIN — TECHNETIUM TC 99M SESTAMIBI 1 DOSE: 1 INJECTION INTRAVENOUS at 08:30

## 2023-08-29 ENCOUNTER — OFFICE VISIT (OUTPATIENT)
Dept: CARDIOLOGY | Facility: CLINIC | Age: 73
End: 2023-08-29
Payer: MEDICARE

## 2023-08-29 VITALS
HEIGHT: 69 IN | OXYGEN SATURATION: 99 % | BODY MASS INDEX: 23.67 KG/M2 | SYSTOLIC BLOOD PRESSURE: 127 MMHG | RESPIRATION RATE: 16 BRPM | HEART RATE: 72 BPM | DIASTOLIC BLOOD PRESSURE: 71 MMHG | WEIGHT: 159.8 LBS

## 2023-08-29 DIAGNOSIS — I20.8 STABLE ANGINA: ICD-10-CM

## 2023-08-29 DIAGNOSIS — R07.2 PRECORDIAL PAIN: Primary | ICD-10-CM

## 2023-08-29 PROCEDURE — 99214 OFFICE O/P EST MOD 30 MIN: CPT | Performed by: PHYSICIAN ASSISTANT

## 2023-08-29 PROCEDURE — 1160F RVW MEDS BY RX/DR IN RCRD: CPT | Performed by: PHYSICIAN ASSISTANT

## 2023-08-29 PROCEDURE — 1159F MED LIST DOCD IN RCRD: CPT | Performed by: PHYSICIAN ASSISTANT

## 2023-08-29 RX ORDER — METOPROLOL SUCCINATE 25 MG/1
12.5 TABLET, EXTENDED RELEASE ORAL DAILY
Qty: 30 TABLET | Refills: 3 | Status: SHIPPED | OUTPATIENT
Start: 2023-08-29

## 2023-08-29 RX ORDER — NITROGLYCERIN 0.4 MG/1
TABLET SUBLINGUAL
Qty: 100 TABLET | Refills: 11 | Status: SHIPPED | OUTPATIENT
Start: 2023-08-29

## 2023-08-29 NOTE — PROGRESS NOTES
"Subjective     Jose Luis Ahuja is a 73 y.o. male who presents to day for Follow-up (Stress findings) and Med Management (Verbal).    CHIEF COMPLIANT  Chief Complaint   Patient presents with    Follow-up     Stress findings    Med Management     Verbal       Active Problems:  Problem List Items Addressed This Visit    None      HPI  HPI    PRIOR MEDS  Current Outpatient Medications on File Prior to Visit   Medication Sig Dispense Refill    cholecalciferol (VITAMIN D3) 400 units tablet Take 1 tablet by mouth Daily.      GARLIC OIL PO Take  by mouth.      Omega-3 Fatty Acids (FISH OIL) 1000 MG capsule capsule Take 1,200 mg by mouth Daily With Breakfast.      vitamin E 400 UNIT capsule Take 1 capsule by mouth Daily.       No current facility-administered medications on file prior to visit.       ALLERGIES  Patient has no known allergies.    HISTORY  Past Medical History:   Diagnosis Date    Colon polyps     Hyperlipidemia     Skin cancer     Vitamin D deficiency        Social History     Socioeconomic History    Marital status:    Tobacco Use    Smoking status: Never    Smokeless tobacco: Never   Vaping Use    Vaping Use: Never used   Substance and Sexual Activity    Alcohol use: No    Drug use: No    Sexual activity: Defer       Family History   Problem Relation Age of Onset    Heart disease Mother     Heart failure Mother     Liver cancer Father     Heart attack Father        Review of Systems    Objective     VITALS: /71   Pulse 72   Resp 16   Ht 175.3 cm (69\")   Wt 72.5 kg (159 lb 12.8 oz)   SpO2 99%   BMI 23.60 kg/mý     LABS:   Lab Results (most recent)       None            IMAGING:   Holter monitor - 48 hour    Addendum Date: 7/12/2023      The patient was monitored for 2 days 7 minutes.   The predominant rhythm noted during the testing period was sinus rhythm.   Average HR: 69. Min HR: 44. Max HR: 127.   Frequent PVCs with a PVC burden of 4.49% with ventricular bigeminy and trigeminy.  No V. " tach   No significant bradycardia, AV block or long pauses.   Patient reported symptoms (unspecified) correlated with sinus rhythm in 70s to 90s.       EXAM:  Physical Exam    Procedure   Procedures       Assessment & Plan     There are no diagnoses linked to this encounter.    No follow-ups on file.      Advance Care Planning   {Advance Directive Status:76404}             MEDS ORDERED DURING VISIT:  No orders of the defined types were placed in this encounter.      As always, Sekou Mcmanus, DO  I appreciate very much the opportunity to participate in the cardiovascular care of your patients. Please do not hesitate to call me with any questions with regards to Jose Luis DEONTE Ahuja evaluation and management.         This document has been electronically signed by Ginette Lindsey MD  August 29, 2023 10:01 EDT    This note is dictated utilizing voice recognition software.

## 2023-08-29 NOTE — PROGRESS NOTES
Sekou Mcmanus DO  Jose Luis Ahuja  1950 08/29/2023    Patient Active Problem List   Diagnosis    Other hyperlipidemia    Elevated blood pressure reading    Vitamin D deficiency disease       Dear Sekou Mcmanus DO:    Subjective     History of Present Illness:    Chief Complaint   Patient presents with    Follow-up     Stress findings    Med Management     Verbal       Jose Luis Ahuja is a pleasant 73 y.o. male with a past medical history significant for no known cardiovascular disease, nondiabetic, nonhypertensive, and no history of tobacco abuse.  He does report family history of coronary artery disease with multiple siblings having coronary artery bypass grafting in their 50s to 60s.  He comes in today for cardiology follow-up.    Jose Luis did have stress test performed where he was noted exercised for 7 minutes achieving 10.1 METS and 86% of his predicted max heart rate with normal myocardial perfusion with no evidence of ischemia.  Clinically he still reports symptoms concerning for stable angina.  He reports whenever he walks or exercises after a few minutes he will develop pressure in the center of his chest that resolves promptly after rest.  He denies symptoms at rest or syncope.    No Known Allergies:      Current Outpatient Medications:     cholecalciferol (VITAMIN D3) 400 units tablet, Take 1 tablet by mouth Daily., Disp: , Rfl:     GARLIC OIL PO, Take  by mouth., Disp: , Rfl:     Omega-3 Fatty Acids (FISH OIL) 1000 MG capsule capsule, Take 1,200 mg by mouth Daily With Breakfast., Disp: , Rfl:     vitamin E 400 UNIT capsule, Take 1 capsule by mouth Daily., Disp: , Rfl:     metoprolol succinate XL (TOPROL-XL) 25 MG 24 hr tablet, Take 0.5 tablets by mouth Daily., Disp: 30 tablet, Rfl: 3    nitroglycerin (NITROSTAT) 0.4 MG SL tablet, 1 under the tongue as needed for angina, may repeat q5mins for up three doses, Disp: 100 tablet, Rfl: 11    The following portions of the patient's history were  "reviewed and updated as appropriate: allergies, current medications, past family history, past medical history, past social history, past surgical history and problem list.    Social History     Tobacco Use    Smoking status: Never    Smokeless tobacco: Never   Vaping Use    Vaping Use: Never used   Substance Use Topics    Alcohol use: No    Drug use: No         Objective   Vitals:    08/29/23 0915   BP: 127/71   Pulse: 72   Resp: 16   SpO2: 99%   Weight: 72.5 kg (159 lb 12.8 oz)   Height: 175.3 cm (69\")     Body mass index is 23.6 kg/mý.    Constitutional:       General: Not in acute distress.     Appearance: Healthy appearance. Well-developed and not in distress. Not diaphoretic.   Eyes:      Conjunctiva/sclera: Conjunctivae normal.      Pupils: Pupils are equal, round, and reactive to light.   HENT:      Head: Normocephalic and atraumatic.   Neck:      Vascular: No carotid bruit or JVD.   Pulmonary:      Effort: Pulmonary effort is normal. No respiratory distress.      Breath sounds: Normal breath sounds.   Cardiovascular:      Normal rate. Regular rhythm.   Edema:     Peripheral edema absent.   Skin:     General: Skin is cool.   Neurological:      Mental Status: Alert, oriented to person, place, and time and oriented to person, place and time.       Lab Results   Component Value Date     06/20/2023    K 4.6 06/20/2023     06/20/2023    CO2 27.4 06/20/2023    BUN 12 06/20/2023    CREATININE 0.92 06/20/2023    GLUCOSE 103 (H) 06/20/2023    CALCIUM 9.3 06/20/2023    AST 25 06/20/2023    ALT 32 06/20/2023    ALKPHOS 55 06/20/2023     No results found for: CKTOTAL  Lab Results   Component Value Date    WBC 6.77 06/20/2023    HGB 14.8 06/20/2023    HCT 44.8 06/20/2023     06/20/2023     No results found for: INR  Lab Results   Component Value Date    MG 2.3 03/03/2020     Lab Results   Component Value Date    TSH 1.610 03/03/2020    PSA 2.370 03/03/2020    TRIG 129 06/20/2023    HDL 48 06/20/2023    "  (H) 06/20/2023      No results found for: BNP    During this visit the following were done:  Labs Reviewed []    Labs Ordered []    Radiology Reports Reviewed []    Radiology Ordered []    PCP Records Reviewed []    Referring Provider Records Reviewed []    ER Records Reviewed []    Hospital Records Reviewed []    History Obtained From Family []    Radiology Images Reviewed []    Other Reviewed []    Records Requested []       Procedures    Assessment & Plan    Diagnosis Plan   1. Precordial pain        2. Stable angina  CT Angiogram Coronary               Recommendations:  Stable angina  Discussed results of stress test with Jose Luis today.  His symptoms are still concerning for stable angina although he has few risk factors I did recommend continued management and further evaluation.  He was agreeable to this.  I will start him on metoprolol succinate 12.5 mg which will hopefully help with both his chest pains and PVC burden  We will also evaluate for underlying coronary artery disease further with CT coronary angiogram he does have significant family history and persistent symptoms.  Continue aspirin 81 mg.    Return in about 3 months (around 11/29/2023).    As always, I appreciate very much the opportunity to participate in the cardiovascular care of your patients.      With Best Regards,    Jaron Weston PA-C

## 2023-10-26 ENCOUNTER — HOSPITAL ENCOUNTER (OUTPATIENT)
Dept: CT IMAGING | Facility: HOSPITAL | Age: 73
Discharge: HOME | End: 2023-10-26
Payer: MEDICARE

## 2023-10-26 ENCOUNTER — TELEPHONE (OUTPATIENT)
Dept: CARDIOLOGY | Facility: CLINIC | Age: 73
End: 2023-10-26

## 2023-10-26 VITALS
HEART RATE: 59 BPM | OXYGEN SATURATION: 99 % | RESPIRATION RATE: 16 BRPM | DIASTOLIC BLOOD PRESSURE: 66 MMHG | TEMPERATURE: 96.9 F | SYSTOLIC BLOOD PRESSURE: 152 MMHG | HEIGHT: 69 IN | WEIGHT: 160 LBS | BODY MASS INDEX: 23.7 KG/M2

## 2023-10-26 DIAGNOSIS — I20.89 STABLE ANGINA: ICD-10-CM

## 2023-10-26 PROCEDURE — A9270 NON-COVERED ITEM OR SERVICE: HCPCS

## 2023-10-26 PROCEDURE — 63710000001 NITROGLYCERIN 0.4 MG SUBLINGUAL TABLET

## 2023-10-26 PROCEDURE — 25510000001 IOPAMIDOL PER 1 ML: Performed by: PHYSICIAN ASSISTANT

## 2023-10-26 PROCEDURE — 75574 CT ANGIO HRT W/3D IMAGE: CPT

## 2023-10-26 RX ORDER — METOPROLOL TARTRATE 50 MG/1
50 TABLET, FILM COATED ORAL ONCE
Status: DISCONTINUED | OUTPATIENT
Start: 2023-10-26 | End: 2023-10-27 | Stop reason: HOSPADM

## 2023-10-26 RX ORDER — METOPROLOL TARTRATE 100 MG/1
200 TABLET ORAL ONCE
Status: DISCONTINUED | OUTPATIENT
Start: 2023-10-26 | End: 2023-10-27 | Stop reason: HOSPADM

## 2023-10-26 RX ORDER — NITROGLYCERIN 0.4 MG/1
TABLET SUBLINGUAL
Status: COMPLETED
Start: 2023-10-26 | End: 2023-10-26

## 2023-10-26 RX ORDER — NITROGLYCERIN 0.4 MG/1
0.8 TABLET SUBLINGUAL
Status: COMPLETED | OUTPATIENT
Start: 2023-10-26 | End: 2023-10-26

## 2023-10-26 RX ORDER — METOPROLOL TARTRATE 100 MG/1
100 TABLET ORAL ONCE
Status: DISCONTINUED | OUTPATIENT
Start: 2023-10-26 | End: 2023-10-27 | Stop reason: HOSPADM

## 2023-10-26 RX ORDER — METOPROLOL TARTRATE 50 MG/1
50 TABLET, FILM COATED ORAL
Status: DISCONTINUED | OUTPATIENT
Start: 2023-10-26 | End: 2023-10-27 | Stop reason: HOSPADM

## 2023-10-26 RX ORDER — METOPROLOL TARTRATE 5 MG/5ML
5 INJECTION INTRAVENOUS
Status: DISCONTINUED | OUTPATIENT
Start: 2023-10-26 | End: 2023-10-27 | Stop reason: HOSPADM

## 2023-10-26 RX ADMIN — IOPAMIDOL 65 ML: 755 INJECTION, SOLUTION INTRAVENOUS at 08:48

## 2023-10-26 RX ADMIN — NITROGLYCERIN 0.8 MG: 0.4 TABLET SUBLINGUAL at 08:41

## 2023-10-26 NOTE — TELEPHONE ENCOUNTER
Caller: Jose Luis Ahuja    Relationship: Self    Best call back number: [phone]     What is the best time to reach you: ANYTIME     What was the call regarding: PT HAD A CT TODAY IN [place], AND HE GOT A LETTER THAT SAYS HIS INSURANCE DOES NOT COVER THIS DUE TO HIS CONDITION. WAS TOLD THAT HE SHOULD CONTACT THE OFFICE TO SEE IF WE COULD CHANGE THE REASON/ CONDITION AND HAVE THIS COVERED THAT WAY.     Is it okay if the provider responds through MyChart: CALL

## 2023-10-27 ENCOUNTER — TELEPHONE (OUTPATIENT)
Dept: CARDIOLOGY | Facility: CLINIC | Age: 73
End: 2023-10-27
Payer: MEDICARE

## 2023-10-27 DIAGNOSIS — Q24.5 CORONARY ARTERY ABNORMALITY: Primary | ICD-10-CM

## 2023-10-27 DIAGNOSIS — R06.09 DYSPNEA ON EXERTION: ICD-10-CM

## 2023-10-27 DIAGNOSIS — R07.2 PRECORDIAL PAIN: ICD-10-CM

## 2023-10-27 DIAGNOSIS — I25.10 CORONARY ARTERY DISEASE INVOLVING NATIVE CORONARY ARTERY OF NATIVE HEART, UNSPECIFIED WHETHER ANGINA PRESENT: Primary | ICD-10-CM

## 2023-10-27 DIAGNOSIS — I20.89 STABLE ANGINA: ICD-10-CM

## 2023-10-27 NOTE — TELEPHONE ENCOUNTER
----- Message from Ankush West MD sent at 10/26/2023  5:46 PM EDT -----  I called this patient last night he has a very high risk abnormal CT angiogram.  Please have him set up for heart cath by me as soon as possible.  Tuesday would be ideal.  Patient is expecting a phone call sometime Friday to schedule.    Spoke with pt - he did received call from  last evening. Pt agreeable to cath on Tuesday. Order placed.

## 2023-10-30 NOTE — TELEPHONE ENCOUNTER
Impression: Presbyopia: H52.4. Plan: New glasses Rx was not given today. 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 Patient educated about good lightening when reading. Sched for cath tomorrow at  Per

## 2023-10-31 ENCOUNTER — HOSPITAL ENCOUNTER (OUTPATIENT)
Facility: HOSPITAL | Age: 73
Setting detail: HOSPITAL OUTPATIENT SURGERY
Discharge: HOME | End: 2023-10-31
Attending: INTERNAL MEDICINE | Admitting: INTERNAL MEDICINE
Payer: MEDICARE

## 2023-10-31 VITALS
HEART RATE: 74 BPM | BODY MASS INDEX: 23.55 KG/M2 | DIASTOLIC BLOOD PRESSURE: 85 MMHG | SYSTOLIC BLOOD PRESSURE: 152 MMHG | RESPIRATION RATE: 18 BRPM | HEIGHT: 69 IN | OXYGEN SATURATION: 98 % | TEMPERATURE: 97.5 F | WEIGHT: 159 LBS

## 2023-10-31 DIAGNOSIS — R07.2 PRECORDIAL PAIN: ICD-10-CM

## 2023-10-31 DIAGNOSIS — I25.10 CORONARY ARTERY DISEASE INVOLVING NATIVE CORONARY ARTERY OF NATIVE HEART, UNSPECIFIED WHETHER ANGINA PRESENT: ICD-10-CM

## 2023-10-31 DIAGNOSIS — R06.09 DYSPNEA ON EXERTION: ICD-10-CM

## 2023-10-31 DIAGNOSIS — I20.89 STABLE ANGINA: ICD-10-CM

## 2023-10-31 LAB
ALBUMIN SERPL-MCNC: 4 G/DL (ref 3.5–5.2)
ALBUMIN/GLOB SERPL: 1.7 G/DL
ALP SERPL-CCNC: 54 U/L (ref 39–117)
ALT SERPL W P-5'-P-CCNC: 14 U/L (ref 1–41)
ANION GAP SERPL CALCULATED.3IONS-SCNC: 8 MMOL/L (ref 5–15)
AST SERPL-CCNC: 15 U/L (ref 1–40)
BILIRUB SERPL-MCNC: 0.5 MG/DL (ref 0–1.2)
BUN SERPL-MCNC: 15 MG/DL (ref 8–23)
BUN/CREAT SERPL: 15 (ref 7–25)
CALCIUM SPEC-SCNC: 8.8 MG/DL (ref 8.6–10.5)
CATH EF ESTIMATED: 60 %
CHLORIDE SERPL-SCNC: 107 MMOL/L (ref 98–107)
CHOLEST SERPL-MCNC: 222 MG/DL (ref 0–200)
CO2 SERPL-SCNC: 27 MMOL/L (ref 22–29)
CREAT SERPL-MCNC: 1 MG/DL (ref 0.76–1.27)
DEPRECATED RDW RBC AUTO: 41.7 FL (ref 37–54)
EGFRCR SERPLBLD CKD-EPI 2021: 79 ML/MIN/1.73
ERYTHROCYTE [DISTWIDTH] IN BLOOD BY AUTOMATED COUNT: 12.2 % (ref 12.3–15.4)
GLOBULIN UR ELPH-MCNC: 2.3 GM/DL
GLUCOSE SERPL-MCNC: 99 MG/DL (ref 65–99)
HBA1C MFR BLD: 6 % (ref 4.8–5.6)
HCT VFR BLD AUTO: 46.5 % (ref 37.5–51)
HDLC SERPL-MCNC: 47 MG/DL (ref 40–60)
HGB BLD-MCNC: 15.4 G/DL (ref 13–17.7)
LDLC SERPL CALC-MCNC: 158 MG/DL (ref 0–100)
LDLC/HDLC SERPL: 3.33 {RATIO}
MCH RBC QN AUTO: 30.6 PG (ref 26.6–33)
MCHC RBC AUTO-ENTMCNC: 33.1 G/DL (ref 31.5–35.7)
MCV RBC AUTO: 92.4 FL (ref 79–97)
PLATELET # BLD AUTO: 248 10*3/MM3 (ref 140–450)
PMV BLD AUTO: 11.2 FL (ref 6–12)
POTASSIUM SERPL-SCNC: 4.4 MMOL/L (ref 3.5–5.2)
PROT SERPL-MCNC: 6.3 G/DL (ref 6–8.5)
RBC # BLD AUTO: 5.03 10*6/MM3 (ref 4.14–5.8)
SODIUM SERPL-SCNC: 142 MMOL/L (ref 136–145)
TRIGL SERPL-MCNC: 93 MG/DL (ref 0–150)
VLDLC SERPL-MCNC: 17 MG/DL (ref 5–40)
WBC NRBC COR # BLD: 6.78 10*3/MM3 (ref 3.4–10.8)

## 2023-10-31 PROCEDURE — 25010000002 FENTANYL CITRATE (PF) 50 MCG/ML SOLUTION: Performed by: INTERNAL MEDICINE

## 2023-10-31 PROCEDURE — 25510000001 IOPAMIDOL PER 1 ML: Performed by: INTERNAL MEDICINE

## 2023-10-31 PROCEDURE — 80053 COMPREHEN METABOLIC PANEL: CPT | Performed by: NURSE PRACTITIONER

## 2023-10-31 PROCEDURE — 25810000003 SODIUM CHLORIDE 0.9 % SOLUTION: Performed by: INTERNAL MEDICINE

## 2023-10-31 PROCEDURE — 25010000002 HEPARIN (PORCINE) PER 1000 UNITS: Performed by: INTERNAL MEDICINE

## 2023-10-31 PROCEDURE — 80061 LIPID PANEL: CPT | Performed by: NURSE PRACTITIONER

## 2023-10-31 PROCEDURE — 93458 L HRT ARTERY/VENTRICLE ANGIO: CPT | Performed by: INTERNAL MEDICINE

## 2023-10-31 PROCEDURE — 85027 COMPLETE CBC AUTOMATED: CPT | Performed by: NURSE PRACTITIONER

## 2023-10-31 PROCEDURE — 25010000002 MIDAZOLAM PER 1 MG: Performed by: INTERNAL MEDICINE

## 2023-10-31 PROCEDURE — 25810000003 SODIUM CHLORIDE 0.9 % SOLUTION: Performed by: NURSE PRACTITIONER

## 2023-10-31 PROCEDURE — 83036 HEMOGLOBIN GLYCOSYLATED A1C: CPT | Performed by: NURSE PRACTITIONER

## 2023-10-31 PROCEDURE — C1894 INTRO/SHEATH, NON-LASER: HCPCS | Performed by: INTERNAL MEDICINE

## 2023-10-31 PROCEDURE — C1769 GUIDE WIRE: HCPCS | Performed by: INTERNAL MEDICINE

## 2023-10-31 RX ORDER — SODIUM CHLORIDE 9 MG/ML
40 INJECTION, SOLUTION INTRAVENOUS AS NEEDED
Status: DISCONTINUED | OUTPATIENT
Start: 2023-10-31 | End: 2023-10-31 | Stop reason: HOSPADM

## 2023-10-31 RX ORDER — SODIUM CHLORIDE 0.9 % (FLUSH) 0.9 %
1-10 SYRINGE (ML) INJECTION AS NEEDED
Status: DISCONTINUED | OUTPATIENT
Start: 2023-10-31 | End: 2023-10-31 | Stop reason: HOSPADM

## 2023-10-31 RX ORDER — ASPIRIN 81 MG/1
81 TABLET ORAL DAILY
COMMUNITY

## 2023-10-31 RX ORDER — FENTANYL CITRATE 50 UG/ML
INJECTION, SOLUTION INTRAMUSCULAR; INTRAVENOUS
Status: DISCONTINUED | OUTPATIENT
Start: 2023-10-31 | End: 2023-10-31 | Stop reason: HOSPADM

## 2023-10-31 RX ORDER — MIDAZOLAM HYDROCHLORIDE 1 MG/ML
INJECTION INTRAMUSCULAR; INTRAVENOUS
Status: DISCONTINUED | OUTPATIENT
Start: 2023-10-31 | End: 2023-10-31 | Stop reason: HOSPADM

## 2023-10-31 RX ORDER — ONDANSETRON 2 MG/ML
4 INJECTION INTRAMUSCULAR; INTRAVENOUS EVERY 6 HOURS PRN
Status: DISCONTINUED | OUTPATIENT
Start: 2023-10-31 | End: 2023-10-31 | Stop reason: HOSPADM

## 2023-10-31 RX ORDER — NICARDIPINE HCL-0.9% SOD CHLOR 1 MG/10 ML
SYRINGE (ML) INTRAVENOUS
Status: DISCONTINUED | OUTPATIENT
Start: 2023-10-31 | End: 2023-10-31 | Stop reason: HOSPADM

## 2023-10-31 RX ORDER — HEPARIN SODIUM 1000 [USP'U]/ML
INJECTION, SOLUTION INTRAVENOUS; SUBCUTANEOUS
Status: DISCONTINUED | OUTPATIENT
Start: 2023-10-31 | End: 2023-10-31 | Stop reason: HOSPADM

## 2023-10-31 RX ORDER — CLOPIDOGREL BISULFATE 75 MG/1
75 TABLET ORAL DAILY
Qty: 30 TABLET | Refills: 11 | Status: SHIPPED | OUTPATIENT
Start: 2023-10-31

## 2023-10-31 RX ORDER — ASPIRIN 325 MG
325 TABLET ORAL ONCE
Status: COMPLETED | OUTPATIENT
Start: 2023-10-31 | End: 2023-10-31

## 2023-10-31 RX ORDER — CLOPIDOGREL BISULFATE 75 MG/1
300 TABLET ORAL ONCE
Status: COMPLETED | OUTPATIENT
Start: 2023-10-31 | End: 2023-10-31

## 2023-10-31 RX ORDER — LIDOCAINE HYDROCHLORIDE 10 MG/ML
INJECTION, SOLUTION EPIDURAL; INFILTRATION; INTRACAUDAL; PERINEURAL
Status: DISCONTINUED | OUTPATIENT
Start: 2023-10-31 | End: 2023-10-31 | Stop reason: HOSPADM

## 2023-10-31 RX ORDER — ACETAMINOPHEN 325 MG/1
650 TABLET ORAL EVERY 4 HOURS PRN
Status: DISCONTINUED | OUTPATIENT
Start: 2023-10-31 | End: 2023-10-31 | Stop reason: HOSPADM

## 2023-10-31 RX ORDER — SODIUM CHLORIDE 0.9 % (FLUSH) 0.9 %
10 SYRINGE (ML) INJECTION EVERY 12 HOURS SCHEDULED
Status: DISCONTINUED | OUTPATIENT
Start: 2023-10-31 | End: 2023-10-31 | Stop reason: HOSPADM

## 2023-10-31 RX ORDER — NITROGLYCERIN 0.4 MG/1
0.4 TABLET SUBLINGUAL
Status: DISCONTINUED | OUTPATIENT
Start: 2023-10-31 | End: 2023-10-31 | Stop reason: HOSPADM

## 2023-10-31 RX ORDER — ASPIRIN 325 MG
325 TABLET, DELAYED RELEASE (ENTERIC COATED) ORAL DAILY
Status: DISCONTINUED | OUTPATIENT
Start: 2023-11-01 | End: 2023-10-31 | Stop reason: HOSPADM

## 2023-10-31 RX ADMIN — CLOPIDOGREL BISULFATE 300 MG: 75 TABLET ORAL at 16:10

## 2023-10-31 RX ADMIN — ASPIRIN 325 MG: 325 TABLET ORAL at 11:19

## 2023-10-31 RX ADMIN — SODIUM CHLORIDE 217.8 ML: 9 INJECTION, SOLUTION INTRAVENOUS at 11:17

## 2023-10-31 NOTE — H&P (VIEW-ONLY)
[facility]   History and physical Note    Referring Provider: Dr. Vazquez      Patient Care Team:  Sekou Mcmanus DO as PCP - General (Family Medicine)        Past Medical History:   Diagnosis Date    Colon polyps     Hyperlipidemia     Skin cancer     Vitamin D deficiency        Past Surgical History:   Procedure Laterality Date    COLONOSCOPY  06/14/2019    Dr. Martines     EYE SURGERY      SKIN CANCER EXCISION      Mult.          No Known Allergies        Current Facility-Administered Medications:     [COMPLETED] aspirin tablet 325 mg, 325 mg, Oral, Once, 325 mg at 10/31/23 1119 **AND** [START ON 11/1/2023] aspirin EC tablet 325 mg, 325 mg, Oral, Daily, Clekeyannaer Clarisa, APRN    nitroglycerin (NITROSTAT) SL tablet 0.4 mg, 0.4 mg, Sublingual, Q5 Min PRN, Clarisa Mcginnis, APRN    ondansetron (ZOFRAN) injection 4 mg, 4 mg, Intravenous, Q6H PRN, Saqib Mcginnisle, APRN    sodium chloride 0.9 % bolus 217.8 mL, 3 mL/kg, Intravenous, Once Over 1 Hour, VernerClarisa, APRN, Last Rate: 217.8 mL/hr at 10/31/23 1117, 217.8 mL at 10/31/23 1117    sodium chloride 0.9 % flush 1-10 mL, 1-10 mL, Intravenous, PRN, Verner, Clarisa, APRN    sodium chloride 0.9 % flush 10 mL, 10 mL, Intravenous, Q12H, Verner Clarisa, APRN    sodium chloride 0.9 % infusion 40 mL, 40 mL, Intravenous, PRN, Verner Clarisa, APRN           Medications Prior to Admission   Medication Sig Dispense Refill Last Dose    aspirin 81 MG EC tablet Take 1 tablet by mouth Daily.   10/30/2023 at 0800    cholecalciferol (VITAMIN D3) 400 units tablet Take 1 tablet by mouth Daily.   10/30/2023 at 0800    GARLIC OIL PO Take  by mouth.   10/30/2023 at 0800    metoprolol succinate XL (TOPROL-XL) 25 MG 24 hr tablet Take 0.5 tablets by mouth Daily. 30 tablet 3 10/30/2023 at 1800    Omega-3 Fatty Acids (FISH OIL) 1000 MG capsule capsule Take 1,200 mg by mouth Daily With Breakfast.   10/30/2023 at 0800    vitamin E 400 UNIT capsule  Take 1 capsule by mouth Daily.   10/30/2023 at 0800    nitroglycerin (NITROSTAT) 0.4 MG SL tablet 1 under the tongue as needed for angina, may repeat q5mins for up three doses 100 tablet 11 Unknown         Subjective .   History of present illness:    Patient is a 73-year-old  male who presents today for cardiac catheterization plus or minus catheter-based intervention secondary to anginal symptoms and abnormal coronary CTA suggesting significant left main disease.  Patient has no previous history of coronary artery disease.  Notes that he has had chest discomfort off and on for years.  Within the last few months this has become progressively worse.  It is interfering with his daily life including activities such as playing [employer] ball.  Notes exertional chest discomfort and fatigue.  No reported syncope, presyncope.  Patient underwent further evaluation with coronary CTA with abnormal results as noted below.  Due to this he was scheduled for cardiac catheterization.      Social History     Socioeconomic History    Marital status:    Tobacco Use    Smoking status: Never    Smokeless tobacco: Never   Vaping Use    Vaping Use: Never used   Substance and Sexual Activity    Alcohol use: No    Drug use: No    Sexual activity: Defer     Family History   Problem Relation Age of Onset    Heart disease Mother     Heart failure Mother     Liver cancer Father     Heart attack Father          Review of Systems:  Review of Systems   Constitutional: Positive for malaise/fatigue. Negative for fever.   HENT:  Negative for nosebleeds.    Eyes:  Negative for redness and visual disturbance.   Cardiovascular:  Positive for chest pain and dyspnea on exertion. Negative for orthopnea, palpitations and paroxysmal nocturnal dyspnea.   Respiratory:  Negative for cough, snoring, sputum production and wheezing.    Hematologic/Lymphatic: Negative for bleeding problem.   Skin:  Negative for flushing, itching and rash.  "  Musculoskeletal:  Positive for arthritis. Negative for falls, joint pain and muscle cramps.   Gastrointestinal:  Negative for abdominal pain, diarrhea, heartburn, nausea and vomiting.   Genitourinary:  Negative for hematuria.   Neurological:  Negative for excessive daytime sleepiness, dizziness, headaches, tremors and weakness.   Psychiatric/Behavioral:  Negative for substance abuse. The patient is nervous/anxious.          Telemetry, sinus rhythm occasional PVCs     Objective   Vitals:  There were no vitals taken for this visit.       Vitals reviewed.   Constitutional:       Appearance: Healthy appearance. Well-developed and not in distress.   Neck:      Vascular: No JVD.      Trachea: No tracheal deviation.   Pulmonary:      Effort: Pulmonary effort is normal.      Breath sounds: Normal breath sounds.   Cardiovascular:      Normal rate. Regular rhythm.      Murmurs: There is no murmur.      Comments: Right Joaquin's positive  Pulses:     Intact distal pulses.   Edema:     Peripheral edema absent.   Abdominal:      General: Bowel sounds are normal.      Palpations: Abdomen is soft.      Tenderness: There is no abdominal tenderness.   Musculoskeletal:         General: No deformity. Skin:     General: Skin is warm and dry.   Neurological:      Mental Status: Alert and oriented to person, place, and time.              Results Review:  I reviewed the patient's new clinical results.  Results from last 7 days   Lab Units 10/31/23  1052   WBC 10*3/mm3 6.78   HEMOGLOBIN g/dL 15.4   HEMATOCRIT % 46.5   PLATELETS 10*3/mm3 248           Invalid input(s): \"LABALBU\", \"PROT\"          No results found for: \"TROPONINT\"                Coronary CTA 10/26/2023  IMPRESSION:     1.  Stenosis: Severe (70-99%) distal left main stenosis involving the ostial LAD and left circumflex.  CAD RADS 4     2.  Plaque:   Overall there is moderate amount of coronary plaque.  Left main plaque appears \"high risk\" with possible ulceration and highly " lipid failed.     3.  Non-atherosclerotic coronary abnormalities:  None      Assessment & Plan     Progressive angina with coronary CTA suggesting significant distal left main disease.  Family history of coronary disease  Dyslipidemia  Yarsani (request no blood products)      Plan:    We will proceed to cardiac catheterization plus or minus catheter-based intervention today.  This was discussed with patient and family.  They verbalized understanding and wished to proceed.  Further recommendations to follow procedure.        MINISTERIO Villanueva   Dictated utilizing Dragon dictation

## 2023-10-31 NOTE — H&P
[facility] at [facility]   History and physical Note    Referring Provider: Dr. Vazquez      Patient Care Team:  Sekou Mcmanus DO as PCP - General (Family Medicine)        Past Medical History:   Diagnosis Date    Colon polyps     Hyperlipidemia     Skin cancer     Vitamin D deficiency        Past Surgical History:   Procedure Laterality Date    COLONOSCOPY  06/14/2019    Dr. Martines     EYE SURGERY      SKIN CANCER EXCISION      Mult.          No Known Allergies        Current Facility-Administered Medications:     [COMPLETED] aspirin tablet 325 mg, 325 mg, Oral, Once, 325 mg at 10/31/23 1119 **AND** [START ON 11/1/2023] aspirin EC tablet 325 mg, 325 mg, Oral, Daily, Clekeyannaer Clarisa, APRN    nitroglycerin (NITROSTAT) SL tablet 0.4 mg, 0.4 mg, Sublingual, Q5 Min PRN, Clarisa Mcginnis, APRN    ondansetron (ZOFRAN) injection 4 mg, 4 mg, Intravenous, Q6H PRN, Saqib Mcginnisle, APRN    sodium chloride 0.9 % bolus 217.8 mL, 3 mL/kg, Intravenous, Once Over 1 Hour, VernerClarisa, APRN, Last Rate: 217.8 mL/hr at 10/31/23 1117, 217.8 mL at 10/31/23 1117    sodium chloride 0.9 % flush 1-10 mL, 1-10 mL, Intravenous, PRN, Verner, Clarisa, APRN    sodium chloride 0.9 % flush 10 mL, 10 mL, Intravenous, Q12H, Verner Clarisa, APRN    sodium chloride 0.9 % infusion 40 mL, 40 mL, Intravenous, PRN, Verner Clarisa, APRN           Medications Prior to Admission   Medication Sig Dispense Refill Last Dose    aspirin 81 MG EC tablet Take 1 tablet by mouth Daily.   10/30/2023 at 0800    cholecalciferol (VITAMIN D3) 400 units tablet Take 1 tablet by mouth Daily.   10/30/2023 at 0800    GARLIC OIL PO Take  by mouth.   10/30/2023 at 0800    metoprolol succinate XL (TOPROL-XL) 25 MG 24 hr tablet Take 0.5 tablets by mouth Daily. 30 tablet 3 10/30/2023 at 1800    Omega-3 Fatty Acids (FISH OIL) 1000 MG capsule capsule Take 1,200 mg by mouth Daily With Breakfast.   10/30/2023 at 0800    vitamin E 400 UNIT capsule  Take 1 capsule by mouth Daily.   10/30/2023 at 0800    nitroglycerin (NITROSTAT) 0.4 MG SL tablet 1 under the tongue as needed for angina, may repeat q5mins for up three doses 100 tablet 11 Unknown         Subjective .   History of present illness:    Patient is a 73-year-old  male who presents today for cardiac catheterization plus or minus catheter-based intervention secondary to anginal symptoms and abnormal coronary CTA suggesting significant left main disease.  Patient has no previous history of coronary artery disease.  Notes that he has had chest discomfort off and on for years.  Within the last few months this has become progressively worse.  It is interfering with his daily life including activities such as playing [employer] ball.  Notes exertional chest discomfort and fatigue.  No reported syncope, presyncope.  Patient underwent further evaluation with coronary CTA with abnormal results as noted below.  Due to this he was scheduled for cardiac catheterization.      Social History     Socioeconomic History    Marital status:    Tobacco Use    Smoking status: Never    Smokeless tobacco: Never   Vaping Use    Vaping Use: Never used   Substance and Sexual Activity    Alcohol use: No    Drug use: No    Sexual activity: Defer     Family History   Problem Relation Age of Onset    Heart disease Mother     Heart failure Mother     Liver cancer Father     Heart attack Father          Review of Systems:  Review of Systems   Constitutional: Positive for malaise/fatigue. Negative for fever.   HENT:  Negative for nosebleeds.    Eyes:  Negative for redness and visual disturbance.   Cardiovascular:  Positive for chest pain and dyspnea on exertion. Negative for orthopnea, palpitations and paroxysmal nocturnal dyspnea.   Respiratory:  Negative for cough, snoring, sputum production and wheezing.    Hematologic/Lymphatic: Negative for bleeding problem.   Skin:  Negative for flushing, itching and rash.  "  Musculoskeletal:  Positive for arthritis. Negative for falls, joint pain and muscle cramps.   Gastrointestinal:  Negative for abdominal pain, diarrhea, heartburn, nausea and vomiting.   Genitourinary:  Negative for hematuria.   Neurological:  Negative for excessive daytime sleepiness, dizziness, headaches, tremors and weakness.   Psychiatric/Behavioral:  Negative for substance abuse. The patient is nervous/anxious.          Telemetry, sinus rhythm occasional PVCs     Objective   Vitals:  There were no vitals taken for this visit.       Vitals reviewed.   Constitutional:       Appearance: Healthy appearance. Well-developed and not in distress.   Neck:      Vascular: No JVD.      Trachea: No tracheal deviation.   Pulmonary:      Effort: Pulmonary effort is normal.      Breath sounds: Normal breath sounds.   Cardiovascular:      Normal rate. Regular rhythm.      Murmurs: There is no murmur.      Comments: Right Joaquin's positive  Pulses:     Intact distal pulses.   Edema:     Peripheral edema absent.   Abdominal:      General: Bowel sounds are normal.      Palpations: Abdomen is soft.      Tenderness: There is no abdominal tenderness.   Musculoskeletal:         General: No deformity. Skin:     General: Skin is warm and dry.   Neurological:      Mental Status: Alert and oriented to person, place, and time.              Results Review:  I reviewed the patient's new clinical results.  Results from last 7 days   Lab Units 10/31/23  1052   WBC 10*3/mm3 6.78   HEMOGLOBIN g/dL 15.4   HEMATOCRIT % 46.5   PLATELETS 10*3/mm3 248           Invalid input(s): \"LABALBU\", \"PROT\"          No results found for: \"TROPONINT\"                Coronary CTA 10/26/2023  IMPRESSION:     1.  Stenosis: Severe (70-99%) distal left main stenosis involving the ostial LAD and left circumflex.  CAD RADS 4     2.  Plaque:   Overall there is moderate amount of coronary plaque.  Left main plaque appears \"high risk\" with possible ulceration and highly " lipid failed.     3.  Non-atherosclerotic coronary abnormalities:  None      Assessment & Plan     Progressive angina with coronary CTA suggesting significant distal left main disease.  Family history of coronary disease  Dyslipidemia  Mandaen (request no blood products)      Plan:    We will proceed to cardiac catheterization plus or minus catheter-based intervention today.  This was discussed with patient and family.  They verbalized understanding and wished to proceed.  Further recommendations to follow procedure.        MINISTERIO Villanueva   Dictated utilizing Dragon dictation

## 2023-10-31 NOTE — Clinical Note
Hemostasis started on the right radial artery. R-Band was used in achieving hemostasis. Radial compression device applied to vessel. Hemostasis achieved successfully. Closure device additional comment: TR band 9CC

## 2023-11-09 ENCOUNTER — HOSPITAL ENCOUNTER (OUTPATIENT)
Facility: HOSPITAL | Age: 73
Setting detail: HOSPITAL OUTPATIENT SURGERY
Discharge: HOME | End: 2023-11-09
Attending: INTERNAL MEDICINE | Admitting: INTERNAL MEDICINE
Payer: MEDICARE

## 2023-11-09 VITALS
HEART RATE: 81 BPM | RESPIRATION RATE: 16 BRPM | OXYGEN SATURATION: 97 % | TEMPERATURE: 97.2 F | WEIGHT: 157.8 LBS | HEIGHT: 69 IN | SYSTOLIC BLOOD PRESSURE: 145 MMHG | DIASTOLIC BLOOD PRESSURE: 62 MMHG | BODY MASS INDEX: 23.37 KG/M2

## 2023-11-09 DIAGNOSIS — I25.118 CORONARY ARTERY DISEASE OF NATIVE ARTERY OF NATIVE HEART WITH STABLE ANGINA PECTORIS: Primary | ICD-10-CM

## 2023-11-09 DIAGNOSIS — I25.10 CORONARY ARTERY DISEASE INVOLVING NATIVE CORONARY ARTERY OF NATIVE HEART, UNSPECIFIED WHETHER ANGINA PRESENT: ICD-10-CM

## 2023-11-09 PROBLEM — R07.2 PRECORDIAL PAIN: Status: RESOLVED | Noted: 2023-10-27 | Resolved: 2023-11-09

## 2023-11-09 PROBLEM — R06.09 DYSPNEA ON EXERTION: Status: RESOLVED | Noted: 2023-10-27 | Resolved: 2023-11-09

## 2023-11-09 PROBLEM — I20.89 STABLE ANGINA: Status: RESOLVED | Noted: 2023-10-27 | Resolved: 2023-11-09

## 2023-11-09 PROBLEM — E78.5 HYPERLIPIDEMIA LDL GOAL <70: Status: ACTIVE | Noted: 2018-01-02

## 2023-11-09 LAB
ALBUMIN SERPL-MCNC: 4.4 G/DL (ref 3.5–5.2)
ALBUMIN/GLOB SERPL: 2.1 G/DL
ALP SERPL-CCNC: 61 U/L (ref 39–117)
ALT SERPL W P-5'-P-CCNC: 16 U/L (ref 1–41)
ANION GAP SERPL CALCULATED.3IONS-SCNC: 8 MMOL/L (ref 5–15)
AST SERPL-CCNC: 19 U/L (ref 1–40)
BILIRUB SERPL-MCNC: 0.6 MG/DL (ref 0–1.2)
BUN BLDA-MCNC: 14 MG/DL (ref 8–26)
BUN SERPL-MCNC: 12 MG/DL (ref 8–23)
BUN/CREAT SERPL: 10.7 (ref 7–25)
CA-I BLDA-SCNC: 0.8 MMOL/L (ref 1.2–1.32)
CALCIUM SPEC-SCNC: 8.9 MG/DL (ref 8.6–10.5)
CHLORIDE BLDA-SCNC: 102 MMOL/L (ref 98–109)
CHLORIDE SERPL-SCNC: 104 MMOL/L (ref 98–107)
CO2 BLDA-SCNC: 26 MMOL/L (ref 24–29)
CO2 SERPL-SCNC: 27 MMOL/L (ref 22–29)
CREAT BLDA-MCNC: 1.2 MG/DL (ref 0.6–1.3)
CREAT SERPL-MCNC: 1.12 MG/DL (ref 0.76–1.27)
DEPRECATED RDW RBC AUTO: 42.3 FL (ref 37–54)
EGFRCR SERPLBLD CKD-EPI 2021: 63.5 ML/MIN/1.73
EGFRCR SERPLBLD CKD-EPI 2021: 68.9 ML/MIN/1.73
ERYTHROCYTE [DISTWIDTH] IN BLOOD BY AUTOMATED COUNT: 12.3 % (ref 12.3–15.4)
GLOBULIN UR ELPH-MCNC: 2.1 GM/DL
GLUCOSE BLDC GLUCOMTR-MCNC: 106 MG/DL (ref 70–130)
GLUCOSE SERPL-MCNC: 110 MG/DL (ref 65–99)
HCT VFR BLD AUTO: 45.4 % (ref 37.5–51)
HCT VFR BLDA CALC: 46 % (ref 38–51)
HGB BLD-MCNC: 14.9 G/DL (ref 13–17.7)
HGB BLDA-MCNC: 15.6 G/DL (ref 12–17)
MCH RBC QN AUTO: 30.3 PG (ref 26.6–33)
MCHC RBC AUTO-ENTMCNC: 32.8 G/DL (ref 31.5–35.7)
MCV RBC AUTO: 92.5 FL (ref 79–97)
PLATELET # BLD AUTO: 262 10*3/MM3 (ref 140–450)
PMV BLD AUTO: 10.2 FL (ref 6–12)
POTASSIUM BLDA-SCNC: 5.6 MMOL/L (ref 3.5–4.9)
POTASSIUM SERPL-SCNC: 4.3 MMOL/L (ref 3.5–5.2)
PROT SERPL-MCNC: 6.5 G/DL (ref 6–8.5)
RBC # BLD AUTO: 4.91 10*6/MM3 (ref 4.14–5.8)
SODIUM BLD-SCNC: 139 MMOL/L (ref 138–146)
SODIUM SERPL-SCNC: 139 MMOL/L (ref 136–145)
WBC NRBC COR # BLD: 7.25 10*3/MM3 (ref 3.4–10.8)

## 2023-11-09 PROCEDURE — 92978 ENDOLUMINL IVUS OCT C 1ST: CPT | Performed by: INTERNAL MEDICINE

## 2023-11-09 PROCEDURE — C1894 INTRO/SHEATH, NON-LASER: HCPCS | Performed by: INTERNAL MEDICINE

## 2023-11-09 PROCEDURE — 99153 MOD SED SAME PHYS/QHP EA: CPT | Performed by: INTERNAL MEDICINE

## 2023-11-09 PROCEDURE — 25510000001 IOPAMIDOL PER 1 ML: Performed by: INTERNAL MEDICINE

## 2023-11-09 PROCEDURE — C1874 STENT, COATED/COV W/DEL SYS: HCPCS | Performed by: INTERNAL MEDICINE

## 2023-11-09 PROCEDURE — 99152 MOD SED SAME PHYS/QHP 5/>YRS: CPT | Performed by: INTERNAL MEDICINE

## 2023-11-09 PROCEDURE — 25810000003 SODIUM CHLORIDE 0.9 % SOLUTION: Performed by: NURSE PRACTITIONER

## 2023-11-09 PROCEDURE — 80047 BASIC METABLC PNL IONIZED CA: CPT

## 2023-11-09 PROCEDURE — C1769 GUIDE WIRE: HCPCS | Performed by: INTERNAL MEDICINE

## 2023-11-09 PROCEDURE — C1760 CLOSURE DEV, VASC: HCPCS | Performed by: INTERNAL MEDICINE

## 2023-11-09 PROCEDURE — C1887 CATHETER, GUIDING: HCPCS | Performed by: INTERNAL MEDICINE

## 2023-11-09 PROCEDURE — C9600 PERC DRUG-EL COR STENT SING: HCPCS | Performed by: INTERNAL MEDICINE

## 2023-11-09 PROCEDURE — 25010000002 BIVALIRUDIN TRIFLUOROACETATE 250 MG RECONSTITUTED SOLUTION 1 EACH VIAL: Performed by: INTERNAL MEDICINE

## 2023-11-09 PROCEDURE — C1725 CATH, TRANSLUMIN NON-LASER: HCPCS | Performed by: INTERNAL MEDICINE

## 2023-11-09 PROCEDURE — 85014 HEMATOCRIT: CPT

## 2023-11-09 PROCEDURE — 25010000002 MIDAZOLAM PER 1 MG: Performed by: INTERNAL MEDICINE

## 2023-11-09 PROCEDURE — 85027 COMPLETE CBC AUTOMATED: CPT | Performed by: NURSE PRACTITIONER

## 2023-11-09 PROCEDURE — C1753 CATH, INTRAVAS ULTRASOUND: HCPCS | Performed by: INTERNAL MEDICINE

## 2023-11-09 PROCEDURE — 25010000002 FENTANYL CITRATE (PF) 50 MCG/ML SOLUTION: Performed by: INTERNAL MEDICINE

## 2023-11-09 PROCEDURE — 80053 COMPREHEN METABOLIC PANEL: CPT | Performed by: NURSE PRACTITIONER

## 2023-11-09 PROCEDURE — 92928 PRQ TCAT PLMT NTRAC ST 1 LES: CPT | Performed by: INTERNAL MEDICINE

## 2023-11-09 DEVICE — XIENCE SKYPOINT™ EVEROLIMUS ELUTING CORONARY STENT SYSTEM 4.00 MM X 18 MM / RAPID-EXCHANGE
Type: IMPLANTABLE DEVICE | Site: CORONARY | Status: FUNCTIONAL
Brand: XIENCE SKYPOINT™

## 2023-11-09 DEVICE — XIENCE SKYPOINT™ EVEROLIMUS ELUTING CORONARY STENT SYSTEM 3.25 MM X 23 MM / RAPID-EXCHANGE
Type: IMPLANTABLE DEVICE | Site: CORONARY | Status: FUNCTIONAL
Brand: XIENCE SKYPOINT™

## 2023-11-09 RX ORDER — ROSUVASTATIN CALCIUM 20 MG/1
20 TABLET, COATED ORAL DAILY
Qty: 90 TABLET | Refills: 3 | Status: SHIPPED | OUTPATIENT
Start: 2023-11-09

## 2023-11-09 RX ORDER — ASPIRIN 325 MG
325 TABLET, DELAYED RELEASE (ENTERIC COATED) ORAL DAILY
Status: DISCONTINUED | OUTPATIENT
Start: 2023-11-10 | End: 2023-11-09 | Stop reason: HOSPADM

## 2023-11-09 RX ORDER — ACETAMINOPHEN 325 MG/1
650 TABLET ORAL EVERY 4 HOURS PRN
Status: DISCONTINUED | OUTPATIENT
Start: 2023-11-09 | End: 2023-11-09 | Stop reason: HOSPADM

## 2023-11-09 RX ORDER — MIDAZOLAM HYDROCHLORIDE 1 MG/ML
INJECTION INTRAMUSCULAR; INTRAVENOUS
Status: DISCONTINUED | OUTPATIENT
Start: 2023-11-09 | End: 2023-11-09 | Stop reason: HOSPADM

## 2023-11-09 RX ORDER — LIDOCAINE HYDROCHLORIDE 10 MG/ML
INJECTION, SOLUTION EPIDURAL; INFILTRATION; INTRACAUDAL; PERINEURAL
Status: DISCONTINUED | OUTPATIENT
Start: 2023-11-09 | End: 2023-11-09 | Stop reason: HOSPADM

## 2023-11-09 RX ORDER — CLOPIDOGREL BISULFATE 75 MG/1
TABLET ORAL
Status: DISCONTINUED | OUTPATIENT
Start: 2023-11-09 | End: 2023-11-09 | Stop reason: HOSPADM

## 2023-11-09 RX ORDER — SODIUM CHLORIDE 0.9 % (FLUSH) 0.9 %
1-10 SYRINGE (ML) INJECTION AS NEEDED
Status: DISCONTINUED | OUTPATIENT
Start: 2023-11-09 | End: 2023-11-09 | Stop reason: HOSPADM

## 2023-11-09 RX ORDER — ONDANSETRON 2 MG/ML
4 INJECTION INTRAMUSCULAR; INTRAVENOUS EVERY 6 HOURS PRN
Status: DISCONTINUED | OUTPATIENT
Start: 2023-11-09 | End: 2023-11-09 | Stop reason: HOSPADM

## 2023-11-09 RX ORDER — SODIUM CHLORIDE 0.9 % (FLUSH) 0.9 %
10 SYRINGE (ML) INJECTION EVERY 12 HOURS SCHEDULED
Status: DISCONTINUED | OUTPATIENT
Start: 2023-11-09 | End: 2023-11-09 | Stop reason: HOSPADM

## 2023-11-09 RX ORDER — NITROGLYCERIN 0.4 MG/1
0.4 TABLET SUBLINGUAL
Status: DISCONTINUED | OUTPATIENT
Start: 2023-11-09 | End: 2023-11-09 | Stop reason: HOSPADM

## 2023-11-09 RX ORDER — ASPIRIN 81 MG/1
243 TABLET, CHEWABLE ORAL ONCE
Qty: 3 TABLET | Refills: 0 | Status: COMPLETED | OUTPATIENT
Start: 2023-11-09 | End: 2023-11-09

## 2023-11-09 RX ORDER — FENTANYL CITRATE 50 UG/ML
INJECTION, SOLUTION INTRAMUSCULAR; INTRAVENOUS
Status: DISCONTINUED | OUTPATIENT
Start: 2023-11-09 | End: 2023-11-09 | Stop reason: HOSPADM

## 2023-11-09 RX ORDER — ASPIRIN 325 MG
325 TABLET ORAL ONCE
Status: DISCONTINUED | OUTPATIENT
Start: 2023-11-09 | End: 2023-11-09

## 2023-11-09 RX ORDER — SODIUM CHLORIDE 9 MG/ML
40 INJECTION, SOLUTION INTRAVENOUS AS NEEDED
Status: DISCONTINUED | OUTPATIENT
Start: 2023-11-09 | End: 2023-11-09 | Stop reason: HOSPADM

## 2023-11-09 RX ADMIN — ASPIRIN 81 MG CHEWABLE TABLET 243 MG: 81 TABLET CHEWABLE at 08:38

## 2023-11-09 RX ADMIN — NITROGLYCERIN 0.4 MG: 0.4 TABLET SUBLINGUAL at 12:24

## 2023-11-09 RX ADMIN — NITROGLYCERIN 0.4 MG: 0.4 TABLET SUBLINGUAL at 13:39

## 2023-11-09 RX ADMIN — ACETAMINOPHEN 650 MG: 325 TABLET ORAL at 12:07

## 2023-11-09 RX ADMIN — SODIUM CHLORIDE 216.3 ML: 9 INJECTION, SOLUTION INTRAVENOUS at 08:27

## 2023-11-09 NOTE — Clinical Note
First balloon inflation max pressure = 15 savannah. First balloon inflation duration = 15 seconds. Second inflation of balloon - Max pressure = 18 savannah. 2nd Inflation of balloon - Duration = 15 seconds.

## 2023-11-09 NOTE — Clinical Note
First balloon inflation max pressure = 10 savannah. First balloon inflation duration = 48 seconds. Second inflation of balloon - Max pressure = 10 savannah. 2nd Inflation of balloon - Duration = 38 seconds. Third inflation of balloon - Max pressure = 16 savannah. 3rd Inflation of balloon - Duration = 15 seconds.

## 2023-11-09 NOTE — INTERVAL H&P NOTE
Patient underwent cardiac catheterization on 10/31/2023.  At that time he was noted to have 60% left main stenosis.  Patient was discharged and brought back for scheduled intervention which she presents for today.  We will proceed to left main intervention today via femoral access.  This was discussed with patient and family.  They verbalized understanding and wished to proceed.    [facility] 10/31/2023    60% distal left main extending into the proximal LAD.    Otherwise nonflow-limiting coronary artery disease.    LVEF 60%      MINISTERIO Villanueva

## 2023-11-09 NOTE — Clinical Note
First balloon inflation max pressure = 16 savannah. First balloon inflation duration = 10 seconds. Second inflation of balloon - Max pressure = 15 savannah. 2nd Inflation of balloon - Duration = 10 seconds.

## 2023-11-09 NOTE — Clinical Note
Hemostasis started on the right femoral artery. Angio-Seal was used in achieving hemostasis. Closure device deployed in the vessel. Hemostasis achieved successfully. Closure device additional comment: 6 FR

## 2023-11-10 ENCOUNTER — DOCUMENTATION (OUTPATIENT)
Dept: CARDIAC REHAB | Facility: HOSPITAL | Age: 73
End: 2023-11-10
Payer: MEDICARE

## 2023-11-10 ENCOUNTER — CALL CENTER PROGRAMS (OUTPATIENT)
Dept: CALL CENTER | Facility: HOSPITAL | Age: 73
End: 2023-11-10
Payer: MEDICARE

## 2023-11-10 NOTE — PROGRESS NOTES
Cardiac Rehab staff mailed referral letter to patient regarding Phase II Cardiac Rehab program. Instruction for patient to contact [facility] Card[facility] Department for additional program information and to forward referral to closest Cardiac Rehab program.

## 2023-11-10 NOTE — OUTREACH NOTE
PCI/Device Survey      Flowsheet Row Responses   Facility patient discharged from? [facility]   Procedure date 11/09/23   Procedure (if device, specify in description) PCI   PCI site Right, Groin   Performing MD Dr. Ankush West   Attempt successful? Yes   Call start time 1034   Call end time 1042   Person spoke with today (if not patient) and relationship wife   Nursing interventions Patient education provided   Is the patient taking prescribed medications: ASA, Plavix   Nursing intervention Reminded to continue to take prescribed medications, Nurse provided patient education   Does the patient have any of the following symptoms related to the cath/surgical site? --  [Right fem dressing in place, no issues reported. Pt has some soreness at that area.]   Nursing intervention Patient education provided   Does the patient have an appointment scheduled with the cardiologist? Yes   If the patient is a current smoker, are they able to teach back resources for cessation? Not a smoker   Did the patient feel prepared to go home on the same day as the procedure? Yes   Is the patient satisfied with the same day discharge process? Yes   PCI/Device call completed Yes            Marla RAMON - Registered Nurse

## 2023-11-12 PROCEDURE — 99285 EMERGENCY DEPT VISIT HI MDM: CPT

## 2023-11-13 ENCOUNTER — APPOINTMENT (OUTPATIENT)
Dept: ULTRASOUND IMAGING | Facility: HOSPITAL | Age: 73
End: 2023-11-13
Payer: MEDICARE

## 2023-11-13 ENCOUNTER — TELEPHONE (OUTPATIENT)
Dept: CARDIOLOGY | Facility: CLINIC | Age: 73
End: 2023-11-13
Payer: MEDICARE

## 2023-11-13 ENCOUNTER — HOSPITAL ENCOUNTER (EMERGENCY)
Facility: HOSPITAL | Age: 73
Discharge: HOME | End: 2023-11-13
Attending: EMERGENCY MEDICINE | Admitting: EMERGENCY MEDICINE
Payer: MEDICARE

## 2023-11-13 ENCOUNTER — APPOINTMENT (OUTPATIENT)
Dept: CT IMAGING | Facility: HOSPITAL | Age: 73
End: 2023-11-13
Payer: MEDICARE

## 2023-11-13 VITALS
TEMPERATURE: 98.2 F | HEIGHT: 69 IN | SYSTOLIC BLOOD PRESSURE: 133 MMHG | HEART RATE: 82 BPM | RESPIRATION RATE: 18 BRPM | OXYGEN SATURATION: 100 % | WEIGHT: 159 LBS | BODY MASS INDEX: 23.55 KG/M2 | DIASTOLIC BLOOD PRESSURE: 88 MMHG

## 2023-11-13 DIAGNOSIS — I82.411 ACUTE DEEP VEIN THROMBOSIS (DVT) OF FEMORAL VEIN OF RIGHT LOWER EXTREMITY: Primary | ICD-10-CM

## 2023-11-13 LAB
ALBUMIN SERPL-MCNC: 4.1 G/DL (ref 3.5–5.2)
ALBUMIN/GLOB SERPL: 1.4 G/DL
ALP SERPL-CCNC: 68 U/L (ref 39–117)
ALT SERPL W P-5'-P-CCNC: 19 U/L (ref 1–41)
ANION GAP SERPL CALCULATED.3IONS-SCNC: 10.7 MMOL/L (ref 5–15)
APTT PPP: 30.4 SECONDS (ref 26.5–34.5)
AST SERPL-CCNC: 23 U/L (ref 1–40)
BASOPHILS # BLD AUTO: 0.04 10*3/MM3 (ref 0–0.2)
BASOPHILS NFR BLD AUTO: 0.4 % (ref 0–1.5)
BILIRUB SERPL-MCNC: 0.3 MG/DL (ref 0–1.2)
BUN SERPL-MCNC: 17 MG/DL (ref 8–23)
BUN/CREAT SERPL: 14.4 (ref 7–25)
CALCIUM SPEC-SCNC: 9.2 MG/DL (ref 8.6–10.5)
CHLORIDE SERPL-SCNC: 103 MMOL/L (ref 98–107)
CO2 SERPL-SCNC: 22.3 MMOL/L (ref 22–29)
CREAT SERPL-MCNC: 1.18 MG/DL (ref 0.76–1.27)
DEPRECATED RDW RBC AUTO: 42.6 FL (ref 37–54)
EGFRCR SERPLBLD CKD-EPI 2021: 64.8 ML/MIN/1.73
EOSINOPHIL # BLD AUTO: 0.31 10*3/MM3 (ref 0–0.4)
EOSINOPHIL NFR BLD AUTO: 3.1 % (ref 0.3–6.2)
ERYTHROCYTE [DISTWIDTH] IN BLOOD BY AUTOMATED COUNT: 12.4 % (ref 12.3–15.4)
GLOBULIN UR ELPH-MCNC: 2.9 GM/DL
GLUCOSE SERPL-MCNC: 128 MG/DL (ref 65–99)
HCT VFR BLD AUTO: 45.6 % (ref 37.5–51)
HGB BLD-MCNC: 14.8 G/DL (ref 13–17.7)
HOLD SPECIMEN: NORMAL
HOLD SPECIMEN: NORMAL
IMM GRANULOCYTES # BLD AUTO: 0.04 10*3/MM3 (ref 0–0.05)
IMM GRANULOCYTES NFR BLD AUTO: 0.4 % (ref 0–0.5)
INR PPP: 0.85 (ref 0.9–1.1)
LYMPHOCYTES # BLD AUTO: 2.14 10*3/MM3 (ref 0.7–3.1)
LYMPHOCYTES NFR BLD AUTO: 21.3 % (ref 19.6–45.3)
MCH RBC QN AUTO: 30.1 PG (ref 26.6–33)
MCHC RBC AUTO-ENTMCNC: 32.5 G/DL (ref 31.5–35.7)
MCV RBC AUTO: 92.9 FL (ref 79–97)
MONOCYTES # BLD AUTO: 0.86 10*3/MM3 (ref 0.1–0.9)
MONOCYTES NFR BLD AUTO: 8.6 % (ref 5–12)
NEUTROPHILS NFR BLD AUTO: 6.65 10*3/MM3 (ref 1.7–7)
NEUTROPHILS NFR BLD AUTO: 66.2 % (ref 42.7–76)
NRBC BLD AUTO-RTO: 0 /100 WBC (ref 0–0.2)
PLATELET # BLD AUTO: 257 10*3/MM3 (ref 140–450)
PMV BLD AUTO: 10 FL (ref 6–12)
POTASSIUM SERPL-SCNC: 4.4 MMOL/L (ref 3.5–5.2)
PROT SERPL-MCNC: 7 G/DL (ref 6–8.5)
PROTHROMBIN TIME: 12 SECONDS (ref 12.1–14.7)
RBC # BLD AUTO: 4.91 10*6/MM3 (ref 4.14–5.8)
SODIUM SERPL-SCNC: 136 MMOL/L (ref 136–145)
WBC NRBC COR # BLD: 10.04 10*3/MM3 (ref 3.4–10.8)
WHOLE BLOOD HOLD COAG: NORMAL
WHOLE BLOOD HOLD SPECIMEN: NORMAL

## 2023-11-13 PROCEDURE — 93971 EXTREMITY STUDY: CPT | Performed by: RADIOLOGY

## 2023-11-13 PROCEDURE — 71275 CT ANGIOGRAPHY CHEST: CPT

## 2023-11-13 PROCEDURE — 85025 COMPLETE CBC W/AUTO DIFF WBC: CPT | Performed by: EMERGENCY MEDICINE

## 2023-11-13 PROCEDURE — 25510000001 IOPAMIDOL PER 1 ML: Performed by: EMERGENCY MEDICINE

## 2023-11-13 PROCEDURE — 85730 THROMBOPLASTIN TIME PARTIAL: CPT | Performed by: EMERGENCY MEDICINE

## 2023-11-13 PROCEDURE — 93971 EXTREMITY STUDY: CPT

## 2023-11-13 PROCEDURE — 80053 COMPREHEN METABOLIC PANEL: CPT | Performed by: EMERGENCY MEDICINE

## 2023-11-13 PROCEDURE — 85610 PROTHROMBIN TIME: CPT | Performed by: EMERGENCY MEDICINE

## 2023-11-13 PROCEDURE — 36415 COLL VENOUS BLD VENIPUNCTURE: CPT

## 2023-11-13 PROCEDURE — 71275 CT ANGIOGRAPHY CHEST: CPT | Performed by: RADIOLOGY

## 2023-11-13 RX ORDER — APIXABAN 5 MG (74)
5 KIT ORAL TAKE AS DIRECTED
Qty: 74 TABLET | Refills: 0 | Status: SHIPPED | OUTPATIENT
Start: 2023-11-13

## 2023-11-13 RX ADMIN — IOPAMIDOL 89 ML: 755 INJECTION, SOLUTION INTRAVENOUS at 04:51

## 2023-11-13 RX ADMIN — APIXABAN 10 MG: 5 TABLET, FILM COATED ORAL at 06:21

## 2023-11-13 NOTE — ED NOTES
Swelling noted by patient to left upper thigh and site where stents had been placed. Patient was concerned cause his site had more bruising.

## 2023-11-13 NOTE — ED PROVIDER NOTES
Subjective   History of Present Illness  73-year-old male presents to the ED with complaints of leg pain leg swelling.  Past medical history is positive for recent heart cath with stent placement on November 9.  Care was performed by Dr. West.  Patient states that today he noticed that his right lower extremity had some bruising and increased swelling.  Patient was experiencing some mild pain.  Patient denies being short of breath or having any chest pain or cough.  Patient has been compliant with Plavix therapy.  Patient also been compliant with his low-dose aspirin.        Review of Systems   Constitutional: Negative.  Negative for fever.   HENT: Negative.     Respiratory: Negative.     Cardiovascular: Negative.  Negative for chest pain.   Gastrointestinal: Negative.  Negative for abdominal pain.   Endocrine: Negative.    Genitourinary: Negative.  Negative for dysuria.   Musculoskeletal:  Positive for myalgias.   Skin:  Positive for color change.   Neurological: Negative.    Psychiatric/Behavioral: Negative.     All other systems reviewed and are negative.      Past Medical History:   Diagnosis Date    Colon polyps     Hyperlipidemia     Skin cancer     Vitamin D deficiency        No Known Allergies    Past Surgical History:   Procedure Laterality Date    CARDIAC CATHETERIZATION N/A 10/31/2023    Procedure: Left Heart Cath;  Surgeon: Ankush West MD;  Location: [facility] CATH INVASIVE LOCATION;  Service: Cardiovascular;  Laterality: N/A;    COLONOSCOPY  06/14/2019    Dr. Martines     EYE SURGERY      SKIN CANCER EXCISION      Mult.        Family History   Problem Relation Age of Onset    Heart disease Mother     Heart failure Mother     Liver cancer Father     Heart attack Father        Social History     Socioeconomic History    Marital status:    Tobacco Use    Smoking status: Never    Smokeless tobacco: Never   Vaping Use    Vaping Use: Never used   Substance and Sexual Activity    Alcohol use: No     Drug use: No    Sexual activity: Defer           Objective   Physical Exam  Vitals and nursing note reviewed.   Constitutional:       General: He is not in acute distress.     Appearance: He is well-developed. He is not diaphoretic.   HENT:      Head: Normocephalic and atraumatic.      Right Ear: External ear normal.      Left Ear: External ear normal.      Nose: Nose normal.   Eyes:      Conjunctiva/sclera: Conjunctivae normal.   Neck:      Vascular: No JVD.      Trachea: No tracheal deviation.   Cardiovascular:      Rate and Rhythm: Normal rate and regular rhythm.      Heart sounds: Normal heart sounds. No murmur heard.  Pulmonary:      Effort: Pulmonary effort is normal. No respiratory distress.      Breath sounds: Normal breath sounds. No wheezing.   Abdominal:      Palpations: Abdomen is soft.      Tenderness: There is no abdominal tenderness.   Musculoskeletal:         General: No deformity. Normal range of motion.      Cervical back: Normal range of motion and neck supple.   Skin:     General: Skin is warm and dry.      Coloration: Skin is not pale.      Findings: Bruising present. No erythema or rash.      Comments: Localized bruising to the right inguinal area.  Would consider this more than likely postcardiac cath bruising.  No palpable hematomas.  There is increased swelling within the right lower extremity when compared to the left.   Neurological:      Mental Status: He is alert and oriented to person, place, and time.      Cranial Nerves: No cranial nerve deficit.   Psychiatric:         Behavior: Behavior normal.         Thought Content: Thought content normal.         Procedures           ED Course  ED Course as of 11/13/23 0609   Mon Nov 13, 2023   0330 Radiologist called and informed the patient did develop a DVT within his right leg. [RB]   9820 US venous doppler rad interpreted:  POSITIVE FOR DEEP VENOUS THROMBOSIS IN THE RIGHT COMMON FEMORAL AND  PROXIMAL SUPERFICIAL FEMORAL VEINS.   [RB]   2698  CT PE protocol rad interpreted:  NEGATIVE FOR PULMONARY EMBOLISM.  NO ACUTE ABNORMALITY IN THE CHEST. [RB]   0601 Discussed with Dr. Pereyra who is on call for Dr. Childers, cardiac treatment at [facility].  Agreeable with plan to start patient on Eliquis.  Patient will need to contact Dr. Childers's office for updated appt.  [RB]      ED Course User Index  [RB] Marco Camacho II, PA                                           Medical Decision Making  73-year-old male with pain and swelling in his right lower extremity.  Recent heart cath with cardiac stents.    Problems Addressed:  Acute deep vein thrombosis (DVT) of femoral vein of right lower extremity: acute illness or injury     Details: Work-up is positive for DVT.  Discussed with patient's cardiac care team who is agreeable to the plan of starting patient on Eliquis.  Patient educated to contact his cardiologist for future appointment.  Eliquis starter pack will be prescribed.    Amount and/or Complexity of Data Reviewed  External Data Reviewed: notes.  Labs: ordered.  Radiology: ordered. Decision-making details documented in ED Course.    Risk  Prescription drug management.        Final diagnoses:   Acute deep vein thrombosis (DVT) of femoral vein of right lower extremity       ED Disposition  ED Disposition       ED Disposition   Discharge    Condition   Stable    Comment   --               Ankush West MD  [address] [place] [address]  [phone]    Call            Medication List        New Prescriptions      Eliquis DVT/PE Starter Pack tablet therapy pack  Generic drug: Apixaban Starter Pack  Take two 5 mg tablets by mouth every 12 hours for 7 days. Followed by one 5 mg tablet every 12 hours. (Dispense starter pack if available)            Changed      clopidogrel 75 MG tablet  Commonly known as: PLAVIX  Take 1 tablet by mouth Daily.  What changed: when to take this     metoprolol succinate XL 25 MG 24 hr tablet  Commonly  known as: TOPROL-XL  Take 0.5 tablets by mouth Daily.  What changed: when to take this               Where to Get Your Medications        These medications were sent to [facility] - [place], [place] - [address] - [phone]  - [phone] FX  [address], [place] [address]      Phone: [phone]   Alli DVT/PE Starter Pack tablet therapy pack            Marco Camacho II, PA  11/13/23 0609

## 2023-11-14 ENCOUNTER — TELEPHONE (OUTPATIENT)
Dept: FAMILY MEDICINE CLINIC | Facility: CLINIC | Age: 73
End: 2023-11-14
Payer: MEDICARE

## 2023-11-14 NOTE — TELEPHONE ENCOUNTER
He was given Eliquis in the emergency room, as long as you can get your next dose in by noon, I am not overly concerned.  We do not have samples here in the office.

## 2023-11-14 NOTE — TELEPHONE ENCOUNTER
He was given Eliquis in the emergency room, as long as you can get your next dose in by noon, I am not overly concerned.  We do not have samples here in the office.      Spoke with patient he now has his Eliquis.

## 2023-11-14 NOTE — TELEPHONE ENCOUNTER
Caller: Jose Luis Ahuja    Relationship: Self    Best call back number: [phone]    Which medication are you concerned about:     ELIQUIS    What are your concerns:     PATIENT WENT TO [facility] EMERGENCY ROOM YESTERDAY. WAS GIVEN SCRIPT FOR ELIQUIS.  PHARMACY SAID WILL NOT BE READY UNTIL 12:00.    PATIENT CONCERNED BECAUSE HE SHOULD HAVE TAKEN THIS MORNING    PLEASE CALL PATIENT

## 2023-11-15 LAB — CREAT BLDA-MCNC: 1.2 MG/DL (ref 0.6–1.3)

## 2023-11-16 ENCOUNTER — TELEPHONE (OUTPATIENT)
Dept: CARDIOLOGY | Facility: CLINIC | Age: 73
End: 2023-11-16
Payer: MEDICARE

## 2023-11-16 NOTE — TELEPHONE ENCOUNTER
Patient called today to report a recent ER visit where he was diagnosed with a DVT in his RLE. Patient currently on Eliquis and Plavix.     Patient is concerned about intermittent CP that he can feel down his arms. He states the last episode occurred last night and lasted 20 minutes but was not severe. He did not take a nitro. His current /80 Hr 80.    [facility] with stents on 11/9.    Please advise! Thanks,   Trinh CABRERA

## 2023-11-16 NOTE — TELEPHONE ENCOUNTER
Patient called and said he had some slight chest pains but not too bad. He is thinking it could be gas pains and it isn't hurting at this moment. He wants to know if this is something to be concerned about or just watch it?

## 2023-11-17 NOTE — TELEPHONE ENCOUNTER
Clarisa Mcginnis APRN  You26 minutes ago (8:53 AM)     Would defer further work-up to his primary cardiologist.  Could have intermittent stretch discomfort for up to 2 weeks postintervention.     Relayed recommendations to patient. Patient verbalizes understanding.

## 2023-11-17 NOTE — TELEPHONE ENCOUNTER
If he develops any further chest pains  recommend going to the emergency department.  I would like to have him in next week to be seen as well.

## 2023-11-20 ENCOUNTER — OFFICE VISIT (OUTPATIENT)
Dept: CARDIOLOGY | Facility: CLINIC | Age: 73
End: 2023-11-20
Payer: MEDICARE

## 2023-11-20 VITALS
OXYGEN SATURATION: 99 % | DIASTOLIC BLOOD PRESSURE: 79 MMHG | HEART RATE: 63 BPM | SYSTOLIC BLOOD PRESSURE: 133 MMHG | BODY MASS INDEX: 23.88 KG/M2 | WEIGHT: 161.2 LBS | HEIGHT: 69 IN

## 2023-11-20 DIAGNOSIS — E78.5 HYPERLIPIDEMIA LDL GOAL <70: ICD-10-CM

## 2023-11-20 DIAGNOSIS — I25.118 CORONARY ARTERY DISEASE OF NATIVE ARTERY OF NATIVE HEART WITH STABLE ANGINA PECTORIS: Primary | ICD-10-CM

## 2023-11-20 PROCEDURE — 99214 OFFICE O/P EST MOD 30 MIN: CPT | Performed by: PHYSICIAN ASSISTANT

## 2023-11-20 NOTE — PROGRESS NOTES
Sekou Mcmanus DO  Jose Luis Ahuja  1950 11/20/2023    Patient Active Problem List   Diagnosis    Hyperlipidemia LDL goal <70    Elevated blood pressure reading    Vitamin D deficiency disease    Coronary artery disease involving native coronary artery of native heart       Dear Sekou Mcmanus DO:    Subjective     History of Present Illness:    Chief Complaint   Patient presents with    Follow-up     IMPROVEMENT WITH CP       Jose Luis Ahuja is a pleasant 73 y.o. male with a past medical history significant for coronary artery disease recently discovered on 8/29/2023 with CT coronary angiogram revealing left main and proximal LAD stenosis with subsequent left heart cath showing 60% stenosis that was stented.  He did unfortunately developed a right lower DVT and is now on Eliquis.  Otherwise he is nondiabetic nonhypertensive, and no history of nicotine abuse.  He comes in today for cardiology follow-up.    Since Jose Luis was last seen he did undergo left heart catheterization and stenting of left main and ostial LAD reports he tolerated this procedure well initially however unfortunately did develop a DVT a couple days later.  He was placed on Eliquis and is here today for follow-up.  He does report his chest pains have vastly improved although he still reports some minor pain when he is exerting himself such as walking to his mailbox and back but even this he states has been improving on a near daily basis.  He does report that he developed significant swelling in his right lower extremity following left heart catheterization but reports the swelling has nearly resolved now at roughly 1+ pedal edema.  Since developing his DVT he denies any new chest pains or worsening.  He denies any significant shortness of breath or bleeding issues with his Eliquis.    No Known Allergies:      Current Outpatient Medications:     apixaban (ELIQUIS) 5 MG tablet tablet, Take 1 tablet by mouth 2 (Two) Times a Day.,  Disp: 28 tablet, Rfl: 0    Apixaban Starter Pack (Eliquis DVT/PE Starter Pack) tablet therapy pack, Take two 5 mg tablets by mouth every 12 hours for 7 days. Followed by one 5 mg tablet every 12 hours. (Dispense starter pack if available), Disp: 74 tablet, Rfl: 0    Apixaban Starter Pack (Eliquis DVT/PE Starter Pack) tablet therapy pack, Take 2 tablets by mouth every 12 hours for 7 days, then take 1 tablet every 12 hours thereafter., Disp: 74 tablet, Rfl: 0    cholecalciferol (VITAMIN D3) 400 units tablet, Take 1 tablet by mouth Daily., Disp: , Rfl:     clopidogrel (PLAVIX) 75 MG tablet, Take 1 tablet by mouth Daily. (Patient taking differently: Take 1 tablet by mouth Every Evening.), Disp: 30 tablet, Rfl: 11    GARLIC OIL PO, Take  by mouth., Disp: , Rfl:     metoprolol succinate XL (TOPROL-XL) 25 MG 24 hr tablet, Take 0.5 tablets by mouth Daily. (Patient taking differently: Take 0.5 tablets by mouth Every Evening.), Disp: 30 tablet, Rfl: 3    nitroglycerin (NITROSTAT) 0.4 MG SL tablet, 1 under the tongue as needed for angina, may repeat q5mins for up three doses, Disp: 100 tablet, Rfl: 11    Omega-3 Fatty Acids (FISH OIL) 1000 MG capsule capsule, Take 1,200 mg by mouth Daily With Breakfast., Disp: , Rfl:     vitamin E 400 UNIT capsule, Take 1 capsule by mouth Daily., Disp: , Rfl:     aspirin 81 MG EC tablet, Take 1 tablet by mouth Daily., Disp: , Rfl:     rosuvastatin (CRESTOR) 20 MG tablet, Take 1 tablet by mouth Daily. (Patient not taking: Reported on 11/20/2023), Disp: 90 tablet, Rfl: 3    The following portions of the patient's history were reviewed and updated as appropriate: allergies, current medications, past family history, past medical history, past social history, past surgical history and problem list.    Social History     Tobacco Use    Smoking status: Never    Smokeless tobacco: Never   Vaping Use    Vaping Use: Never used   Substance Use Topics    Alcohol use: No    Drug use: No         Objective  "  Vitals:    11/20/23 1409   BP: 133/79   Pulse: 63   SpO2: 99%   Weight: 73.1 kg (161 lb 3.2 oz)   Height: 175.3 cm (69\")     Body mass index is 23.81 kg/m².    ROS    Constitutional:       General: Not in acute distress.     Appearance: Healthy appearance. Well-developed and not in distress. Not diaphoretic.   Eyes:      Conjunctiva/sclera: Conjunctivae normal.      Pupils: Pupils are equal, round, and reactive to light.   HENT:      Head: Normocephalic and atraumatic.   Neck:      Vascular: No carotid bruit or JVD.   Pulmonary:      Effort: Pulmonary effort is normal. No respiratory distress.      Breath sounds: Normal breath sounds.   Cardiovascular:      Normal rate. Regularly irregular rhythm. Having PVCs   Edema:     Peripheral edema absent.   Skin:     General: Skin is cool.   Neurological:      Mental Status: Alert, oriented to person, place, and time and oriented to person, place and time.         Lab Results   Component Value Date     11/13/2023    K 4.4 11/13/2023     11/13/2023    CO2 22.3 11/13/2023    BUN 17 11/13/2023    CREATININE 1.18 11/13/2023    GLUCOSE 128 (H) 11/13/2023    CALCIUM 9.2 11/13/2023    AST 23 11/13/2023    ALT 19 11/13/2023    ALKPHOS 68 11/13/2023     No results found for: \"CKTOTAL\"  Lab Results   Component Value Date    WBC 10.04 11/13/2023    HGB 14.8 11/13/2023    HCT 45.6 11/13/2023     11/13/2023     Lab Results   Component Value Date    INR 0.85 (L) 11/13/2023     Lab Results   Component Value Date    MG 2.3 03/03/2020     Lab Results   Component Value Date    TSH 1.610 03/03/2020    PSA 2.370 03/03/2020    TRIG 93 10/31/2023    HDL 47 10/31/2023     (H) 10/31/2023      No results found for: \"BNP\"    During this visit the following were done:  Labs Reviewed []    Labs Ordered []    Radiology Reports Reviewed []    Radiology Ordered []    PCP Records Reviewed []    Referring Provider Records Reviewed []    ER Records Reviewed []    Hospital Records " Reviewed []    History Obtained From Family []    Radiology Images Reviewed []    Other Reviewed []    Records Requested []       Procedures    Assessment & Plan    Diagnosis Plan   1. Coronary artery disease of native artery of native heart with stable angina pectoris        2. Hyperlipidemia LDL goal <70                 Recommendations:  Coronary artery disease with stable angina  Overall Jose Luis reports his chest pains have markedly improved and reports they have continue to prove on a near daily basis.  He has been mostly sedentary since developing DVT however he does still report some chest pains when he walks out to grab his mail but again he states this is overall improved.  I did offer trial of isosorbide mononitrate however he requested to hold off for now and see if he can continue to improve.  Continue Plavix and Eliquis, metoprolol, and Crestor.  DVT  Anticoagulated with Eliquis denies any new chest pains or sudden onset of dyspnea.  Advised him to monitor for these symptoms and to go to the ER if he develops these he expressed understanding.  Dyslipidemia  Recently initiated on Lipitor will monitor and adjust therapy accordingly.  LDL currently elevated.    No follow-ups on file.    As always, I appreciate very much the opportunity to participate in the cardiovascular care of your patients.      With Best Regards,    Jaron Weston PA-C

## 2023-12-05 ENCOUNTER — TELEPHONE (OUTPATIENT)
Dept: CARDIOLOGY | Facility: CLINIC | Age: 73
End: 2023-12-05
Payer: MEDICARE

## 2023-12-05 NOTE — TELEPHONE ENCOUNTER
Caller: Jose Luis Ahuja    Relationship: Self    Best call back number:[phone]    What is the best time to reach you: ANY    Who are you requesting to speak with (clinical staff, provider,  specific staff member): DR. ALVAREZ      What was the call regarding: PATIENT ADVISING HE IS HAVING CHEST DISCOMFORT UPON LITTLE EXERTION. IT HAS BEEN HAPPENING SINCE THE STENT PLACEMENT. THE PAIN FROM THE PLACEMENT HAS SUBSIDED BUT HE IS CONCERNED ABOUT THE DISCOMFORT AND HE SAYS HE JUST HASN'T FELT GOOD AND WANTS TO SLEEP A LOT. PLEASE CALL HIM ASAP TO DISCUSS.     Is it okay if the provider responds through MyChart: NO

## 2023-12-05 NOTE — TELEPHONE ENCOUNTER
"Spoke with patient regarding call about chest discomfort.  He advises he feels like \"something is not right\", does not feel better after stent, in fact he feels worse.  He has seen cariology close to him home and is extremely unsure of how he wants to proceed.  Advised we are both [employer] so he will need to pick one as his cardiologist.     D/W Dr. West who advises we can schedule a repeat LHC if patient wishes.  He is unsure if that is what he wants to do.    He will call back with his decision.  "

## 2023-12-11 ENCOUNTER — OFFICE VISIT (OUTPATIENT)
Dept: CARDIOLOGY | Facility: CLINIC | Age: 73
End: 2023-12-11
Payer: MEDICARE

## 2023-12-11 VITALS
OXYGEN SATURATION: 98 % | BODY MASS INDEX: 23.64 KG/M2 | WEIGHT: 159.6 LBS | DIASTOLIC BLOOD PRESSURE: 74 MMHG | SYSTOLIC BLOOD PRESSURE: 124 MMHG | HEART RATE: 80 BPM | HEIGHT: 69 IN

## 2023-12-11 DIAGNOSIS — I25.118 CORONARY ARTERY DISEASE INVOLVING NATIVE CORONARY ARTERY OF NATIVE HEART WITH OTHER FORM OF ANGINA PECTORIS: Primary | ICD-10-CM

## 2023-12-11 DIAGNOSIS — Z86.718 HISTORY OF DVT (DEEP VEIN THROMBOSIS): ICD-10-CM

## 2023-12-11 DIAGNOSIS — E78.5 HYPERLIPIDEMIA LDL GOAL <70: ICD-10-CM

## 2023-12-11 DIAGNOSIS — I25.118 CORONARY ARTERY DISEASE OF NATIVE ARTERY OF NATIVE HEART WITH STABLE ANGINA PECTORIS: Primary | ICD-10-CM

## 2023-12-11 PROCEDURE — 1159F MED LIST DOCD IN RCRD: CPT | Performed by: INTERNAL MEDICINE

## 2023-12-11 PROCEDURE — 1160F RVW MEDS BY RX/DR IN RCRD: CPT | Performed by: INTERNAL MEDICINE

## 2023-12-11 PROCEDURE — 99214 OFFICE O/P EST MOD 30 MIN: CPT | Performed by: INTERNAL MEDICINE

## 2023-12-11 NOTE — PROGRESS NOTES
[facility]  Subjective:     Encounter Date: 12/11/2023      Patient ID: Jose Luis Ahuja is a 73 y.o. male.    Chief Complaint: Coronary Artery Disease      PROBLEM LIST:  Coronary artery disease  Exercise MPS, 08/18/2023: EF 63%. Normal with no evidence of ischemia.  Calcium scores, 10/26/2023: The total calcium score is 214 indicating moderate (-300) calcified plaque in the coronary tree.   LHC, 10/31/2023: EF 60%. 60% distal left main extending into the proximal LAD.   OCT, 11/09/2023: 70% distal left main stenosis with 60-70 diffuse plaque throughout the proximal LAD.  OCT guided stenting, 4.0 x 18 Xience and 3.25 x 23 Xience IVANA in overlapping fashion.  Postdilated with 4.5 NC and 3.5 NC trek balloons respectively.  No residual stenosis. No flow-limiting LCx disease  DVT  Venous duplex, 11/12/2023: Positive for DVT in the right common femoral and proximal superficial femoral veins.  Hypertension  Dyslipidemia      History of Present Illness  Jose Luis Ahuja returns today for a hospital follow up with a history of CAD and cardiac risk factors. Since hospitalization, patient has been doing well overall from a cardiovascular standpoint. However, last week he was experiencing chest pain on exertion that has been improving this week. Patient stays busy and active by walking half a mile multiple times weekly with his wife. He is not currently participating in cardiac rehab due to anxiety surrounding his recent DVT. Patient denies shortness of breath, orthopnea, palpitations, edema, dizziness, and syncope.       No Known Allergies      Current Outpatient Medications:     apixaban (ELIQUIS) 5 MG tablet tablet, Take 1 tablet by mouth 2 (Two) Times a Day., Disp: 28 tablet, Rfl: 0    Apixaban Starter Pack (Eliquis DVT/PE Starter Pack) tablet therapy pack, Take two 5 mg tablets by mouth every 12 hours for 7 days. Followed by one 5 mg tablet every 12 hours. (Dispense starter pack  if available), Disp: 74 tablet, Rfl: 0    Apixaban Starter Pack (Eliquis DVT/PE Starter Pack) tablet therapy pack, Take 2 tablets by mouth every 12 hours for 7 days, then take 1 tablet every 12 hours thereafter., Disp: 74 tablet, Rfl: 0    cholecalciferol (VITAMIN D3) 400 units tablet, Take 1 tablet by mouth Daily., Disp: , Rfl:     clopidogrel (PLAVIX) 75 MG tablet, Take 1 tablet by mouth Daily. (Patient taking differently: Take 1 tablet by mouth Every Evening.), Disp: 30 tablet, Rfl: 11    GARLIC OIL PO, Take  by mouth., Disp: , Rfl:     metoprolol succinate XL (TOPROL-XL) 25 MG 24 hr tablet, Take 0.5 tablets by mouth Daily. (Patient taking differently: Take 0.5 tablets by mouth Every Evening.), Disp: 30 tablet, Rfl: 3    nitroglycerin (NITROSTAT) 0.4 MG SL tablet, 1 under the tongue as needed for angina, may repeat q5mins for up three doses, Disp: 100 tablet, Rfl: 11    Omega-3 Fatty Acids (FISH OIL) 1000 MG capsule capsule, Take 1,200 mg by mouth Daily With Breakfast., Disp: , Rfl:     rosuvastatin (CRESTOR) 20 MG tablet, Take 1 tablet by mouth Daily., Disp: 90 tablet, Rfl: 3    vitamin E 400 UNIT capsule, Take 1 capsule by mouth Daily., Disp: , Rfl:     The following portions of the patient's history were reviewed and updated as appropriate: allergies, current medications, past family history, past medical history, past social history, past surgical history and problem list.    Review of Systems   Constitutional: Negative.   Cardiovascular:  Positive for chest pain. Negative for dyspnea on exertion, leg swelling, palpitations and syncope.   Respiratory: Negative.  Negative for shortness of breath.    Hematologic/Lymphatic: Negative for bleeding problem. Does not bruise/bleed easily.   Skin:  Negative for rash.   Musculoskeletal:  Negative for muscle weakness and myalgias.   Gastrointestinal:  Negative for heartburn, nausea and vomiting.   Neurological:  Negative for dizziness, light-headedness, loss of balance  "and numbness.          Objective:     Vitals:    12/11/23 1257   BP: 124/74   BP Location: Right arm   Patient Position: Sitting   Pulse: 80   SpO2: 98%   Weight: 72.4 kg (159 lb 9.6 oz)   Height: 175.3 cm (69\")         Vitals reviewed.   Constitutional:       Appearance: Well-developed and not in distress.   Neck:      Thyroid: No thyromegaly.      Vascular: No carotid bruit or JVD.   Pulmonary:      Breath sounds: Normal breath sounds.   Cardiovascular:      Regular rhythm.      No gallop. No S3 and S4 gallop.   Pulses:     Intact distal pulses.      Carotid: 2+ bilaterally.     Radial: 2+ bilaterally.  Edema:     Peripheral edema absent.   Abdominal:      General: Bowel sounds are normal.      Palpations: Abdomen is soft. There is no abdominal mass.      Tenderness: There is no abdominal tenderness.   Musculoskeletal:         General: No deformity.      Extremities: No clubbing present.Skin:     General: Skin is warm and dry.      Findings: No rash.   Neurological:      Mental Status: Alert and oriented to person, place, and time.         Lab Review:  Lab Results   Component Value Date    GLUCOSE 128 (H) 11/13/2023    BUN 17 11/13/2023    CREATININE 1.18 11/13/2023    BCR 14.4 11/13/2023    K 4.4 11/13/2023    CO2 22.3 11/13/2023    CALCIUM 9.2 11/13/2023    ALBUMIN 4.1 11/13/2023    ALKPHOS 68 11/13/2023    AST 23 11/13/2023    ALT 19 11/13/2023     Lab Results   Component Value Date    CHOL 222 (H) 10/31/2023    TRIG 93 10/31/2023    HDL 47 10/31/2023     (H) 10/31/2023      Lab Results   Component Value Date    WBC 10.04 11/13/2023    RBC 4.91 11/13/2023    HGB 14.8 11/13/2023    HCT 45.6 11/13/2023    MCV 92.9 11/13/2023     11/13/2023     Lab Results   Component Value Date    HGBA1C 6.00 (H) 10/31/2023        Procedures      Advance Care Planning   ACP discussion was held with the patient during this visit. Patient has an advance directive (not in EMR), copy requested.           Assessment: "   Diagnoses and all orders for this visit:    1. Coronary artery disease involving native coronary artery of native heart with other form of angina pectoris (Primary)    2. Hyperlipidemia LDL goal <70    3. History of DVT (deep vein thrombosis)        Impression:  1. Coronary artery disease.  Status post left main stenting.  Now with continued chest pain atypical.  Stable without angina on current activity. Continue on Plavix 75 mg daily for antiplatelet therapy. Continue on nitroglycerin 0.4 mg PRN for chest pain.    2. Hyperlipidemia LDL goal <70. Continue on Omega-3 fatty acids for hyperlipidemia.     3. History of DV post PCI.  Symptoms of edema have resolved. Continue on Eliquis 5 mg BID for DVT prophylaxis.     Plan:  Stable cardiac status.   At this time, his pain is atypical, improving and not clearly exertional, would continue current medical therapy  Start cardiac rehab  Discussed with patient, as long as symptoms continue to improve, will not do relook angiography.  If IVUS symptoms and not improve in the next month, or if they worsen at any point he will contact our office for repeat cardiac cath.  Revisit in 6 weeks, or sooner as needed.          Scribed for Ankush West MD by Jesus Schultz. 12/11/2023 13:29 EST    Please note that portions of this note may have been completed with a voice recognition program. Efforts were made to edit the dictations, but occasionally words are mistranscribed.

## 2023-12-14 ENCOUNTER — DOCUMENTATION (OUTPATIENT)
Dept: CARDIAC REHAB | Facility: HOSPITAL | Age: 73
End: 2023-12-14
Payer: MEDICARE

## 2023-12-14 NOTE — PROGRESS NOTES
Referral received for Phase II Cardiac Rehab.  Staff reviewed chart and patient has qualifying diagnosis for Phase II Cardiac Rehab.  Staff sent patient a letter in regards to Phase II Cardiac Rehab on 11/10/23. Staff will resend letter with instruction for patient to contact [facility] for additional program information and to forward referral to closest Cardiac Rehab program.

## 2023-12-14 NOTE — PROGRESS NOTES
Cardiac Rehab staff mailed referral letter to patient regarding Phase II Cardiac Rehab program. Instruction for patient to contact [facility] Department for additional program information and to forward referral to closest Cardiac Rehab program.

## 2023-12-20 ENCOUNTER — TELEPHONE (OUTPATIENT)
Dept: CARDIOLOGY | Facility: CLINIC | Age: 73
End: 2023-12-20
Payer: MEDICARE

## 2023-12-26 ENCOUNTER — TELEPHONE (OUTPATIENT)
Dept: CARDIAC REHAB | Facility: HOSPITAL | Age: 73
End: 2023-12-26
Payer: MEDICARE

## 2023-12-26 NOTE — TELEPHONE ENCOUNTER
Patient called in regard to Phase II Cardiac Rehab. Staff discussed benefits of exercise and program protocol. Teach back verified.  Permission granted from patient for staff to fax referral information to outlying program at this time.  Staff faxed referral info to [facility].

## 2024-01-03 ENCOUNTER — OFFICE VISIT (OUTPATIENT)
Dept: FAMILY MEDICINE CLINIC | Facility: CLINIC | Age: 74
End: 2024-01-03
Payer: MEDICARE

## 2024-01-03 VITALS
HEART RATE: 78 BPM | OXYGEN SATURATION: 99 % | HEIGHT: 69 IN | TEMPERATURE: 98 F | DIASTOLIC BLOOD PRESSURE: 78 MMHG | BODY MASS INDEX: 23.58 KG/M2 | WEIGHT: 159.2 LBS | SYSTOLIC BLOOD PRESSURE: 136 MMHG

## 2024-01-03 DIAGNOSIS — Z79.01 ANTICOAGULATED: ICD-10-CM

## 2024-01-03 DIAGNOSIS — Z51.81 MEDICATION MONITORING ENCOUNTER: ICD-10-CM

## 2024-01-03 DIAGNOSIS — Z01.10 NORMAL EAR EXAM: Primary | ICD-10-CM

## 2024-01-03 PROCEDURE — 36415 COLL VENOUS BLD VENIPUNCTURE: CPT | Performed by: FAMILY MEDICINE

## 2024-01-03 PROCEDURE — 82550 ASSAY OF CK (CPK): CPT | Performed by: FAMILY MEDICINE

## 2024-01-03 PROCEDURE — 80053 COMPREHEN METABOLIC PANEL: CPT | Performed by: FAMILY MEDICINE

## 2024-01-03 PROCEDURE — 85027 COMPLETE CBC AUTOMATED: CPT | Performed by: FAMILY MEDICINE

## 2024-01-03 NOTE — PROGRESS NOTES
Subjective   Jose Luis Ahuja is a 73 y.o. male.   Pt presents today with CC of check ears      History of Present Illness   History of Present Illness  1.  Patient is missing his right hearing aid piece that would normally go in the ear canal.  He suspects that it may be in his ear canal.  He would like this evaluated.  #2 he was recently started on new medications including Crestor.  He also has been started on Eliquis.  He is concerned about side effects of these medications including liver problems and loss of blood counts.  He would like labs today if indicated.  He has not reported any abdominal pain or other side effects potentially of these medications.       The following portions of the patient's history were reviewed and updated as appropriate: allergies, current medications, past family history, past medical history, past social history, past surgical history, and problem list.    Review of Systems   Constitutional:  Negative for chills, fever and unexpected weight loss.   HENT:  Negative for congestion and sore throat.    Eyes:  Negative for blurred vision and visual disturbance.   Respiratory:  Negative for cough and wheezing.    Cardiovascular:  Negative for chest pain and palpitations.   Gastrointestinal:  Negative for abdominal pain and diarrhea.   Endocrine: Negative for cold intolerance and heat intolerance.   Genitourinary:  Negative for dysuria.   Musculoskeletal:  Negative for arthralgias and neck stiffness.   Neurological:  Negative for dizziness, seizures and syncope.   Psychiatric/Behavioral:  Negative for self-injury, suicidal ideas and depressed mood.      Vitals:    01/03/24 1519   BP: 136/78   Pulse: 78   Temp: 98 °F (36.7 °C)   SpO2: 99%        Objective   Physical Exam  Vitals and nursing note reviewed.   Constitutional:       Appearance: He is well-developed.   HENT:      Head: Normocephalic and atraumatic.      Right Ear: External ear normal.      Left Ear: External ear normal.       Nose: Nose normal.   Eyes:      Conjunctiva/sclera: Conjunctivae normal.      Pupils: Pupils are equal, round, and reactive to light.   Cardiovascular:      Rate and Rhythm: Normal rate and regular rhythm.      Heart sounds: Normal heart sounds.   Pulmonary:      Effort: Pulmonary effort is normal.      Breath sounds: Normal breath sounds.   Abdominal:      General: Bowel sounds are normal.      Palpations: Abdomen is soft.   Musculoskeletal:      Cervical back: Normal range of motion and neck supple.   Skin:     General: Skin is warm and dry.   Neurological:      Mental Status: He is alert and oriented to person, place, and time.   Psychiatric:         Behavior: Behavior normal.           Assessment & Plan   Diagnoses and all orders for this visit:    1. Normal ear exam (Primary)  No problem with his ear canal.  No foreign body  2. Medication monitoring encounter  -     Comprehensive metabolic panel; Future  -     CK; Future  -     Comprehensive metabolic panel  -     CK  Will check labs to rule out problems with anticoagulant or other medications.  3. Anticoagulated  -     CBC No Differential; Future  -     CBC No Differential                  BMI is within normal parameters. No other follow-up for BMI required.          This document has been electronically signed by Cyndee Langley  January 3, 2024 15:21 EST    Dictated Utilizing Dragon Dictation: Part of this note may be an electronic transcription/translation of spoken language to printed text using the Dragon Dictation System.

## 2024-01-04 ENCOUNTER — TELEPHONE (OUTPATIENT)
Dept: CARDIAC REHAB | Facility: HOSPITAL | Age: 74
End: 2024-01-04
Payer: MEDICARE

## 2024-01-04 LAB
ALBUMIN SERPL-MCNC: 4.5 G/DL (ref 3.5–5.2)
ALBUMIN/GLOB SERPL: 2.1 G/DL
ALP SERPL-CCNC: 71 U/L (ref 39–117)
ALT SERPL W P-5'-P-CCNC: 35 U/L (ref 1–41)
ANION GAP SERPL CALCULATED.3IONS-SCNC: 10 MMOL/L (ref 5–15)
AST SERPL-CCNC: 27 U/L (ref 1–40)
BILIRUB SERPL-MCNC: 0.4 MG/DL (ref 0–1.2)
BUN SERPL-MCNC: 13 MG/DL (ref 8–23)
BUN/CREAT SERPL: 10.7 (ref 7–25)
CALCIUM SPEC-SCNC: 9 MG/DL (ref 8.6–10.5)
CHLORIDE SERPL-SCNC: 104 MMOL/L (ref 98–107)
CK SERPL-CCNC: 87 U/L (ref 20–200)
CO2 SERPL-SCNC: 26 MMOL/L (ref 22–29)
CREAT SERPL-MCNC: 1.21 MG/DL (ref 0.76–1.27)
DEPRECATED RDW RBC AUTO: 40.5 FL (ref 37–54)
EGFRCR SERPLBLD CKD-EPI 2021: 62.8 ML/MIN/1.73
ERYTHROCYTE [DISTWIDTH] IN BLOOD BY AUTOMATED COUNT: 12.3 % (ref 12.3–15.4)
GLOBULIN UR ELPH-MCNC: 2.1 GM/DL
GLUCOSE SERPL-MCNC: 120 MG/DL (ref 65–99)
HCT VFR BLD AUTO: 43 % (ref 37.5–51)
HGB BLD-MCNC: 14.5 G/DL (ref 13–17.7)
MCH RBC QN AUTO: 30.5 PG (ref 26.6–33)
MCHC RBC AUTO-ENTMCNC: 33.7 G/DL (ref 31.5–35.7)
MCV RBC AUTO: 90.3 FL (ref 79–97)
PLATELET # BLD AUTO: 244 10*3/MM3 (ref 140–450)
PMV BLD AUTO: 11 FL (ref 6–12)
POTASSIUM SERPL-SCNC: 4 MMOL/L (ref 3.5–5.2)
PROT SERPL-MCNC: 6.6 G/DL (ref 6–8.5)
RBC # BLD AUTO: 4.76 10*6/MM3 (ref 4.14–5.8)
SODIUM SERPL-SCNC: 140 MMOL/L (ref 136–145)
WBC NRBC COR # BLD AUTO: 6.63 10*3/MM3 (ref 3.4–10.8)

## 2024-01-08 ENCOUNTER — TELEPHONE (OUTPATIENT)
Dept: FAMILY MEDICINE CLINIC | Facility: CLINIC | Age: 74
End: 2024-01-08
Payer: MEDICARE

## 2024-01-08 NOTE — TELEPHONE ENCOUNTER
How Severe Is Your Skin Lesion?: moderate Patient notified and voiced understanding.    Is This A New Presentation, Or A Follow-Up?: Skin Lesions

## 2024-01-08 NOTE — TELEPHONE ENCOUNTER
----- Message from Sekou Mcmanus DO sent at 1/8/2024  8:22 AM EST -----  No problems on your blood work.

## 2024-01-10 ENCOUNTER — TELEPHONE (OUTPATIENT)
Dept: CARDIAC REHAB | Facility: HOSPITAL | Age: 74
End: 2024-01-10
Payer: MEDICARE

## 2024-01-10 NOTE — TELEPHONE ENCOUNTER
Called patient regarding Cardiac Rehab referral, patient scheduled to come in 1/25/24 @9AM. Pt would like to attend Tuesday/Thursday @9am. Demographic info verified, orientation packet sent by mail.

## 2024-01-18 ENCOUNTER — DOCUMENTATION (OUTPATIENT)
Dept: CARDIAC REHAB | Facility: HOSPITAL | Age: 74
End: 2024-01-18
Payer: MEDICARE

## 2024-01-22 ENCOUNTER — OFFICE VISIT (OUTPATIENT)
Dept: CARDIOLOGY | Facility: CLINIC | Age: 74
End: 2024-01-22
Payer: MEDICARE

## 2024-01-22 ENCOUNTER — DOCUMENTATION (OUTPATIENT)
Dept: CARDIAC REHAB | Facility: HOSPITAL | Age: 74
End: 2024-01-22
Payer: MEDICARE

## 2024-01-22 VITALS
DIASTOLIC BLOOD PRESSURE: 68 MMHG | SYSTOLIC BLOOD PRESSURE: 124 MMHG | OXYGEN SATURATION: 99 % | HEIGHT: 69 IN | HEART RATE: 67 BPM | WEIGHT: 157.8 LBS | BODY MASS INDEX: 23.37 KG/M2

## 2024-01-22 DIAGNOSIS — I25.118 CORONARY ARTERY DISEASE INVOLVING NATIVE CORONARY ARTERY OF NATIVE HEART WITH OTHER FORM OF ANGINA PECTORIS: Primary | ICD-10-CM

## 2024-01-22 DIAGNOSIS — I10 ESSENTIAL HYPERTENSION: ICD-10-CM

## 2024-01-22 DIAGNOSIS — Z86.718 HISTORY OF DVT (DEEP VEIN THROMBOSIS): ICD-10-CM

## 2024-01-22 DIAGNOSIS — E78.5 HYPERLIPIDEMIA LDL GOAL <70: ICD-10-CM

## 2024-01-22 PROCEDURE — 99214 OFFICE O/P EST MOD 30 MIN: CPT | Performed by: INTERNAL MEDICINE

## 2024-01-22 PROCEDURE — 1160F RVW MEDS BY RX/DR IN RCRD: CPT | Performed by: INTERNAL MEDICINE

## 2024-01-22 PROCEDURE — 1159F MED LIST DOCD IN RCRD: CPT | Performed by: INTERNAL MEDICINE

## 2024-01-22 PROCEDURE — 93000 ELECTROCARDIOGRAM COMPLETE: CPT | Performed by: INTERNAL MEDICINE

## 2024-01-22 NOTE — PROGRESS NOTES
"[facility]iology  Subjective:     Encounter Date: 01/22/2024      Patient ID: Jose Luis Ahuja is a 73 y.o. male.    Chief Complaint: Chest Pain (Discomfort still there on exertion), Shortness of Breath, and Coronary Artery Disease      PROBLEM LIST:  Coronary artery disease  Exercise MPS, 08/18/2023: EF 63%. Normal with no evidence of ischemia.  Calcium scores, 10/26/2023: The total calcium score is 214 indicating moderate (-300) calcified plaque in the coronary tree.   LHC, 10/31/2023: EF 60%. 60% distal left main extending into the proximal LAD.   OCT, 11/09/2023: 70% distal left main stenosis with 60-70 diffuse plaque throughout the proximal LAD.  OCT guided stenting, 4.0 x 18 Xience and 3.25 x 23 Xience IVANA in overlapping fashion.  Postdilated with 4.5 NC and 3.5 NC trek balloons respectively.  No residual stenosis. No flow-limiting LCx disease  DVT  Venous duplex, 11/12/2023: Positive for DVT in the right common femoral and proximal superficial femoral veins.  Hypertension  Dyslipidemia      History of Present Illness  Jose Luis Ahuja returns today for a 6 week follow up with a history of CAD and cardiac risk factors. Since last visit, patient has been doing well overall from a cardiovascular standpoint. He stays busy and active by walking 2 miles daily but notes that when walking uphill he notices an \"empty\" feeling of PVCs in his chest and discomfort in his ribs. Patient reports that he will be starting cardiac rehab on Thursday. He states that he bruises easily from Plavix. Patient reports while shoveling snow last week he experienced discomfort in his chest that was less uncomfortable than the discomfort when he walks. He denies shortness of breath, orthopnea, edema, dizziness, and syncope.     No Known Allergies      Current Outpatient Medications:     apixaban (ELIQUIS) 5 MG tablet tablet, Take 1 tablet by mouth 2 (Two) Times a Day., Disp: 60 tablet, Rfl: 4    " "cholecalciferol (VITAMIN D3) 400 units tablet, Take 1 tablet by mouth Daily., Disp: , Rfl:     clopidogrel (PLAVIX) 75 MG tablet, Take 1 tablet by mouth Daily. (Patient taking differently: Take 1 tablet by mouth Every Evening.), Disp: 30 tablet, Rfl: 11    GARLIC OIL PO, Take  by mouth., Disp: , Rfl:     metoprolol succinate XL (TOPROL-XL) 25 MG 24 hr tablet, Take 0.5 tablets by mouth Daily. (Patient taking differently: Take 0.5 tablets by mouth Every Evening.), Disp: 30 tablet, Rfl: 3    nitroglycerin (NITROSTAT) 0.4 MG SL tablet, 1 under the tongue as needed for angina, may repeat q5mins for up three doses, Disp: 100 tablet, Rfl: 11    Omega-3 Fatty Acids (FISH OIL) 1000 MG capsule capsule, Take 1,200 mg by mouth Daily With Breakfast., Disp: , Rfl:     rosuvastatin (CRESTOR) 20 MG tablet, Take 1 tablet by mouth Daily., Disp: 90 tablet, Rfl: 3    vitamin E 400 UNIT capsule, Take 1 capsule by mouth Daily., Disp: , Rfl:     The following portions of the patient's history were reviewed and updated as appropriate: allergies, current medications, past family history, past medical history, past social history, past surgical history and problem list.    Review of Systems   Constitutional: Negative.   Cardiovascular:  Positive for chest pain and palpitations. Negative for dyspnea on exertion, leg swelling and syncope.   Respiratory: Negative.  Negative for shortness of breath.    Hematologic/Lymphatic: Negative for bleeding problem. Bruises/bleeds easily.   Skin:  Negative for rash.   Musculoskeletal:  Negative for muscle weakness and myalgias.   Gastrointestinal:  Negative for heartburn, nausea and vomiting.   Neurological:  Negative for dizziness, light-headedness, loss of balance and numbness.          Objective:     Vitals:    01/22/24 0904   BP: 124/68   BP Location: Left arm   Patient Position: Sitting   Pulse: 67   SpO2: 99%   Weight: 71.6 kg (157 lb 12.8 oz)   Height: 175.3 cm (69\")         Vitals reviewed. "   Constitutional:       Appearance: Well-developed and not in distress.   Neck:      Thyroid: No thyromegaly.      Vascular: No carotid bruit or JVD.   Pulmonary:      Breath sounds: Normal breath sounds.   Cardiovascular:      Regular rhythm.      No gallop. No S3 and S4 gallop.   Pulses:     Intact distal pulses.      Carotid: 2+ bilaterally.     Radial: 2+ bilaterally.  Edema:     Peripheral edema absent.   Abdominal:      General: Bowel sounds are normal.      Palpations: Abdomen is soft. There is no abdominal mass.      Tenderness: There is no abdominal tenderness.   Musculoskeletal:         General: No deformity.      Extremities: No clubbing present.Skin:     General: Skin is warm and dry.      Findings: No rash.   Neurological:      Mental Status: Alert and oriented to person, place, and time.         Lab Review:  Lab Results   Component Value Date    GLUCOSE 120 (H) 01/03/2024    BUN 13 01/03/2024    CREATININE 1.21 01/03/2024    BCR 10.7 01/03/2024    K 4.0 01/03/2024    CO2 26.0 01/03/2024    CALCIUM 9.0 01/03/2024    ALBUMIN 4.5 01/03/2024    ALKPHOS 71 01/03/2024    AST 27 01/03/2024    ALT 35 01/03/2024     Lab Results   Component Value Date    CHOL 222 (H) 10/31/2023    TRIG 93 10/31/2023    HDL 47 10/31/2023     (H) 10/31/2023      Lab Results   Component Value Date    WBC 6.63 01/03/2024    RBC 4.76 01/03/2024    HGB 14.5 01/03/2024    HCT 43.0 01/03/2024    MCV 90.3 01/03/2024     01/03/2024     Lab Results   Component Value Date    HGBA1C 6.00 (H) 10/31/2023          ECG 12 Lead    Date/Time: 1/22/2024 9:43 AM  Performed by: Ankush West MD    Authorized by: Ankush West MD  Comparison: compared with previous ECG from 7/20/2023  Similar to previous ECG  Rhythm: sinus rhythm and sinus arrhythmia  Ectopy: multifocal PVCs  BPM: 67    Clinical impression: normal ECG            Advance Care Planning   ACP discussion was declined by the patient. Patient has an advance directive in  EMR which is still valid.            Assessment:   Diagnoses and all orders for this visit:    1. Coronary artery disease involving native coronary artery of native heart with other form of angina pectoris (Primary)    2. Hyperlipidemia LDL goal <70    3. History of DVT (deep vein thrombosis)    4. Essential hypertension    Other orders  -     ECG 12 Lead        Impression:  1. Coronary artery disease. Stable without angina on current activity. Continue on Plavix 75 mg daily for antiplatelet therapy. Continue on nitroglycerin 0.4 mg PRN for chest pain.    2. Hyperlipidemia LDL goal <70. Elevated LDL. Continue on Omega-3 fatty acids for hyperlipidemia. Continue on rosuvastatin 20 mg daily for hyperlipidemia.     3. History of DVT (deep vein thrombosis). Continue on Eliquis 5 mg BID for DVT prophylaxis.     4. Essential hypertension. Well controlled. Continue on metoprolol 12.5 mg daily for rate control and hypertension.     5.  Symptomatic PVCs.    Plan:  Stable cardiac status.   May increase Toprol to 25 mg daily for PVCs.  Chest pain is not anginal in nature.  (Actually does not have any difficulty with exertion).  Follow-up with primary cardiology in [place], and may revisit with us as needed.      Scribed for Ankush West MD by Jesus Schultz. 1/22/2024 09:43 EST  Ankush West MD    Please note that portions of this note may have been completed with a voice recognition program. Efforts were made to edit the dictations, but occasionally words are mistranscribed.

## 2024-01-22 NOTE — PROGRESS NOTES
Spoke with patient and explained were he has medicare A&B he will be responsible for 20% copay. He stated he will just exercise at home and if he doesn't feel he can do it he will contact us about the program.

## 2024-02-20 ENCOUNTER — OFFICE VISIT (OUTPATIENT)
Dept: CARDIOLOGY | Facility: CLINIC | Age: 74
End: 2024-02-20
Payer: MEDICARE

## 2024-02-20 VITALS
BODY MASS INDEX: 23.93 KG/M2 | SYSTOLIC BLOOD PRESSURE: 145 MMHG | OXYGEN SATURATION: 98 % | DIASTOLIC BLOOD PRESSURE: 77 MMHG | HEART RATE: 53 BPM | HEIGHT: 69 IN | WEIGHT: 161.6 LBS

## 2024-02-20 DIAGNOSIS — I25.118 CORONARY ARTERY DISEASE INVOLVING NATIVE CORONARY ARTERY OF NATIVE HEART WITH OTHER FORM OF ANGINA PECTORIS: Primary | ICD-10-CM

## 2024-02-20 PROCEDURE — 1159F MED LIST DOCD IN RCRD: CPT | Performed by: PHYSICIAN ASSISTANT

## 2024-02-20 PROCEDURE — 1160F RVW MEDS BY RX/DR IN RCRD: CPT | Performed by: PHYSICIAN ASSISTANT

## 2024-02-20 PROCEDURE — 99214 OFFICE O/P EST MOD 30 MIN: CPT | Performed by: PHYSICIAN ASSISTANT

## 2024-02-20 RX ORDER — ISOSORBIDE MONONITRATE 30 MG/1
30 TABLET, EXTENDED RELEASE ORAL DAILY
Qty: 30 TABLET | Refills: 11 | Status: SHIPPED | OUTPATIENT
Start: 2024-02-20

## 2024-02-20 NOTE — PROGRESS NOTES
Sekou Mcmanus DO  Jose Luis Ahuja  1950 02/20/2024    Patient Active Problem List   Diagnosis    Hyperlipidemia LDL goal <70    Elevated blood pressure reading    Vitamin D deficiency disease    Coronary artery disease involving native coronary artery of native heart       Dear Sekou Mcmanus DO:    Subjective     History of Present Illness:    Chief Complaint   Patient presents with    Follow-up     ROUTINE     PROBLEM LIST:  Coronary artery disease  Exercise MPS, 08/18/2023: EF 63%. Normal with no evidence of ischemia.  Calcium scores, 10/26/2023: The total calcium score is 214 indicating moderate (-300) calcified plaque in the coronary tree.   LHC, 10/31/2023: EF 60%. 60% distal left main extending into the proximal LAD.   OCT, 11/09/2023: 70% distal left main stenosis with 60-70 diffuse plaque throughout the proximal LAD.  OCT guided stenting, 4.0 x 18 Xience and 3.25 x 23 Xience IVANA in overlapping fashion.  Postdilated with 4.5 NC and 3.5 NC trek balloons respectively.  No residual stenosis. No flow-limiting LCx disease  DVT  Venous duplex, 11/12/2023: Positive for DVT in the right common femoral and proximal superficial femoral veins.  Hypertension  Dyslipidemia    Jose Luis Ahuja is a pleasant 73 y.o. male with a past medical history significant for coronary artery disease recently discovered on 8/29/2023 with CT coronary angiogram revealing left main and proximal LAD stenosis with subsequent left heart cath showing 60% stenosis that was stented. He did unfortunately developed a right lower DVT and is now on Eliquis. Otherwise he is nondiabetic nonhypertensive, and no history of nicotine abuse. He comes in today for cardiology follow-up.     Clinically Dale reports he is still having some chest pains that radiate into his lateral chest wall bilaterally. He does still walk daily with goal of walking 2 miles which he is able to do most days of the week. He does state when he first  "starts walking is when his chest pain is at its worse, however, this is the part of his walk that has a slight incline. The rest of his walk is on flat ground. He tells me by the time his walk is finished his pain is improved but doesn't resolve until he rests. He also reports symptoms consistent with PVCs that occur while he is at rest stating they like a \"hole in his chest\" he does report that he has been compliant with all medications including eliquis and denies any bleeding issues.     No Known Allergies:      Current Outpatient Medications:     apixaban (ELIQUIS) 5 MG tablet tablet, Take 1 tablet by mouth 2 (Two) Times a Day., Disp: 60 tablet, Rfl: 4    cholecalciferol (VITAMIN D3) 400 units tablet, Take 1 tablet by mouth Daily., Disp: , Rfl:     clopidogrel (PLAVIX) 75 MG tablet, Take 1 tablet by mouth Daily. (Patient taking differently: Take 1 tablet by mouth Every Evening.), Disp: 30 tablet, Rfl: 11    GARLIC OIL PO, Take  by mouth., Disp: , Rfl:     metoprolol succinate XL (TOPROL-XL) 25 MG 24 hr tablet, Take 0.5 tablets by mouth Daily. (Patient taking differently: Take 0.5 tablets by mouth Every Evening.), Disp: 30 tablet, Rfl: 3    nitroglycerin (NITROSTAT) 0.4 MG SL tablet, 1 under the tongue as needed for angina, may repeat q5mins for up three doses, Disp: 100 tablet, Rfl: 11    Omega-3 Fatty Acids (FISH OIL) 1000 MG capsule capsule, Take 1,200 mg by mouth Daily With Breakfast., Disp: , Rfl:     rosuvastatin (CRESTOR) 20 MG tablet, Take 1 tablet by mouth Daily., Disp: 90 tablet, Rfl: 3    vitamin E 400 UNIT capsule, Take 1 capsule by mouth Daily., Disp: , Rfl:     isosorbide mononitrate (IMDUR) 30 MG 24 hr tablet, Take 1 tablet by mouth Daily., Disp: 30 tablet, Rfl: 11    The following portions of the patient's history were reviewed and updated as appropriate: allergies, current medications, past family history, past medical history, past social history, past surgical history and problem " "list.    Social History     Tobacco Use    Smoking status: Never     Passive exposure: Past    Smokeless tobacco: Never   Vaping Use    Vaping Use: Never used   Substance Use Topics    Alcohol use: No    Drug use: No         Objective   Vitals:    02/20/24 1128   BP: 145/77   Pulse: 53   SpO2: 98%   Weight: 73.3 kg (161 lb 9.6 oz)   Height: 175.3 cm (69\")     Body mass index is 23.86 kg/m².    ROS    Constitutional:       General: Not in acute distress.     Appearance: Healthy appearance. Well-developed and not in distress. Not diaphoretic.   Eyes:      Conjunctiva/sclera: Conjunctivae normal.      Pupils: Pupils are equal, round, and reactive to light.   HENT:      Head: Normocephalic and atraumatic.   Neck:      Vascular: No carotid bruit or JVD.   Pulmonary:      Effort: Pulmonary effort is normal. No respiratory distress.      Breath sounds: Normal breath sounds.   Cardiovascular:      Normal rate. Regular rhythm.   Edema:     Peripheral edema absent.   Skin:     General: Skin is cool.   Neurological:      Mental Status: Alert, oriented to person, place, and time and oriented to person, place and time.         Lab Results   Component Value Date     01/03/2024    K 4.0 01/03/2024     01/03/2024    CO2 26.0 01/03/2024    BUN 13 01/03/2024    CREATININE 1.21 01/03/2024    GLUCOSE 120 (H) 01/03/2024    CALCIUM 9.0 01/03/2024    AST 27 01/03/2024    ALT 35 01/03/2024    ALKPHOS 71 01/03/2024     Lab Results   Component Value Date    CKTOTAL 87 01/03/2024     Lab Results   Component Value Date    WBC 6.63 01/03/2024    HGB 14.5 01/03/2024    HCT 43.0 01/03/2024     01/03/2024     Lab Results   Component Value Date    INR 0.85 (L) 11/13/2023     Lab Results   Component Value Date    MG 2.3 03/03/2020     Lab Results   Component Value Date    TSH 1.610 03/03/2020    PSA 2.370 03/03/2020    TRIG 93 10/31/2023    HDL 47 10/31/2023     (H) 10/31/2023      No results found for: \"BNP\"    During this " visit the following were done:  Labs Reviewed []    Labs Ordered []    Radiology Reports Reviewed []    Radiology Ordered []    PCP Records Reviewed []    Referring Provider Records Reviewed []    ER Records Reviewed []    Hospital Records Reviewed []    History Obtained From Family []    Radiology Images Reviewed []    Other Reviewed []    Records Requested []       Procedures    Assessment & Plan    Diagnosis Plan   1. Coronary artery disease involving native coronary artery of native heart with other form of angina pectoris  Lipid Panel               Recommendations:  CAD, with precordial pain  His symptoms does seem to be mild stable angina, although atypical as he states they improve as he continues to walk. Although the pain is at its worse during the incline portion of his walk. I did discuss options with him with further GDMT which I do recommend. Ultimately, with shared decision making we agreed to start imdur 30 mg daily.   Continue plavix, crestor, metoprolol.   Dyslipidemia  LDL markedly elevated but has not been checked since his his PCI. Will repeat lipid panel. If still elevated may consider increasing dose or trial of PCSK9 inhibitor.     Return in about 4 weeks (around 3/19/2024).    As always, I appreciate very much the opportunity to participate in the cardiovascular care of your patients.      With Best Regards,    Jaron Weston PA-C

## 2024-02-27 ENCOUNTER — LAB (OUTPATIENT)
Dept: FAMILY MEDICINE CLINIC | Facility: CLINIC | Age: 74
End: 2024-02-27
Payer: MEDICARE

## 2024-02-27 DIAGNOSIS — I25.118 CORONARY ARTERY DISEASE INVOLVING NATIVE CORONARY ARTERY OF NATIVE HEART WITH OTHER FORM OF ANGINA PECTORIS: ICD-10-CM

## 2024-02-27 PROCEDURE — 36415 COLL VENOUS BLD VENIPUNCTURE: CPT

## 2024-02-27 PROCEDURE — 80061 LIPID PANEL: CPT | Performed by: PHYSICIAN ASSISTANT

## 2024-02-28 LAB
CHOLEST SERPL-MCNC: 132 MG/DL (ref 0–200)
HDLC SERPL-MCNC: 40 MG/DL (ref 40–60)
LDLC SERPL CALC-MCNC: 77 MG/DL (ref 0–100)
LDLC/HDLC SERPL: 1.94 {RATIO}
TRIGL SERPL-MCNC: 73 MG/DL (ref 0–150)
VLDLC SERPL-MCNC: 15 MG/DL (ref 5–40)

## 2024-03-01 ENCOUNTER — TELEPHONE (OUTPATIENT)
Dept: CARDIOLOGY | Facility: CLINIC | Age: 74
End: 2024-03-01
Payer: MEDICARE

## 2024-03-01 NOTE — TELEPHONE ENCOUNTER
Patient called and said he took the isosorbide for 3 days and had terrible headaches. He has stopped taking it and wonders if he could split it in half and try that or just stop taking?

## 2024-03-04 NOTE — TELEPHONE ENCOUNTER
He can certainly try cutting it in half or we can alternative ranexa which doesn't cause the headache as much and I still very safe. I will be happy to speak with him if he has any questions.

## 2024-03-19 ENCOUNTER — OFFICE VISIT (OUTPATIENT)
Dept: CARDIOLOGY | Facility: CLINIC | Age: 74
End: 2024-03-19
Payer: MEDICARE

## 2024-03-19 VITALS
SYSTOLIC BLOOD PRESSURE: 152 MMHG | HEART RATE: 55 BPM | OXYGEN SATURATION: 99 % | BODY MASS INDEX: 23.4 KG/M2 | HEIGHT: 69 IN | WEIGHT: 158 LBS | DIASTOLIC BLOOD PRESSURE: 73 MMHG

## 2024-03-19 DIAGNOSIS — E78.5 HYPERLIPIDEMIA LDL GOAL <70: ICD-10-CM

## 2024-03-19 DIAGNOSIS — I25.118 CORONARY ARTERY DISEASE OF NATIVE ARTERY OF NATIVE HEART WITH STABLE ANGINA PECTORIS: Primary | ICD-10-CM

## 2024-03-19 PROCEDURE — 99213 OFFICE O/P EST LOW 20 MIN: CPT | Performed by: PHYSICIAN ASSISTANT

## 2024-03-19 PROCEDURE — 1159F MED LIST DOCD IN RCRD: CPT | Performed by: PHYSICIAN ASSISTANT

## 2024-03-19 PROCEDURE — 1160F RVW MEDS BY RX/DR IN RCRD: CPT | Performed by: PHYSICIAN ASSISTANT

## 2024-03-19 NOTE — PROGRESS NOTES
Sekou Mcmanus DO  Jose Luis Ahuja  1950 03/19/2024    Patient Active Problem List   Diagnosis    Hyperlipidemia LDL goal <70    Elevated blood pressure reading    Vitamin D deficiency disease    Coronary artery disease involving native coronary artery of native heart       Dear Sekou Mcmanus DO:    Subjective     History of Present Illness:    Chief Complaint   Patient presents with    Follow-up     ROUTINE    Medication Reaction     HA FROM IMDUR       PROBLEM LIST:  Coronary artery disease  Exercise MPS, 08/18/2023: EF 63%. Normal with no evidence of ischemia.  Calcium scores, 10/26/2023: The total calcium score is 214 indicating moderate (-300) calcified plaque in the coronary tree.   LHC, 10/31/2023: EF 60%. 60% distal left main extending into the proximal LAD.   OCT, 11/09/2023: 70% distal left main stenosis with 60-70 diffuse plaque throughout the proximal LAD.  OCT guided stenting, 4.0 x 18 Xience and 3.25 x 23 Xience IVANA in overlapping fashion.  Postdilated with 4.5 NC and 3.5 NC trek balloons respectively.  No residual stenosis. No flow-limiting LCx disease  DVT  Venous duplex, 11/12/2023: Positive for DVT in the right common femoral and proximal superficial femoral veins.  Hypertension  Dyslipidemia     Jose Luis Ahuja is a pleasant 73 y.o. male with a past medical history significant for coronary artery disease recently discovered on 8/29/2023 with CT coronary angiogram revealing left main and proximal LAD stenosis with subsequent left heart cath showing 60% stenosis that was stented. He did unfortunately developed a right lower DVT and is now on Eliquis. Otherwise he is nondiabetic nonhypertensive, and no history of nicotine abuse. He comes in today for cardiology follow-up.      Dale reports that since he was last seen overall his chest pains have improved.  He tries to walk 2 miles every day initially after stenting he reports he would have to stop and rest during this walk  however now he is able to walk continuously without any stops.  He does report during the incline portion he will get some mild discomfort in his chest but reports that it resolves and he continues to walk without stopping.  He denies any other symptoms such as shortness of breath, palpitations, or syncope.  Cholesterol did show significant improvement with LDL now at 77 previously was as high as 171      No Known Allergies:      Current Outpatient Medications:     apixaban (ELIQUIS) 5 MG tablet tablet, Take 1 tablet by mouth 2 (Two) Times a Day., Disp: 60 tablet, Rfl: 4    cholecalciferol (VITAMIN D3) 400 units tablet, Take 1 tablet by mouth Daily., Disp: , Rfl:     clopidogrel (PLAVIX) 75 MG tablet, Take 1 tablet by mouth Daily. (Patient taking differently: Take 1 tablet by mouth Every Evening.), Disp: 30 tablet, Rfl: 11    GARLIC OIL PO, Take  by mouth., Disp: , Rfl:     metoprolol succinate XL (TOPROL-XL) 25 MG 24 hr tablet, Take 0.5 tablets by mouth Daily. (Patient taking differently: Take 0.5 tablets by mouth Every Evening.), Disp: 30 tablet, Rfl: 3    nitroglycerin (NITROSTAT) 0.4 MG SL tablet, 1 under the tongue as needed for angina, may repeat q5mins for up three doses, Disp: 100 tablet, Rfl: 11    Omega-3 Fatty Acids (FISH OIL) 1000 MG capsule capsule, Take 1,200 mg by mouth Daily With Breakfast., Disp: , Rfl:     rosuvastatin (CRESTOR) 20 MG tablet, Take 1 tablet by mouth Daily., Disp: 90 tablet, Rfl: 3    vitamin E 400 UNIT capsule, Take 1 capsule by mouth Daily., Disp: , Rfl:     isosorbide mononitrate (IMDUR) 30 MG 24 hr tablet, Take 1 tablet by mouth Daily. (Patient not taking: Reported on 3/19/2024), Disp: 30 tablet, Rfl: 11    The following portions of the patient's history were reviewed and updated as appropriate: allergies, current medications, past family history, past medical history, past social history, past surgical history and problem list.    Social History     Tobacco Use    Smoking  "status: Never     Passive exposure: Past    Smokeless tobacco: Never   Vaping Use    Vaping status: Never Used   Substance Use Topics    Alcohol use: No    Drug use: No         Objective   Vitals:    03/19/24 1023   BP: 152/73   Pulse: 55   SpO2: 99%   Weight: 71.7 kg (158 lb)   Height: 175.3 cm (69\")     Body mass index is 23.33 kg/m².    ROS    Constitutional:       General: Not in acute distress.     Appearance: Healthy appearance. Well-developed and not in distress. Not diaphoretic.   Eyes:      Conjunctiva/sclera: Conjunctivae normal.      Pupils: Pupils are equal, round, and reactive to light.   HENT:      Head: Normocephalic and atraumatic.   Neck:      Vascular: No carotid bruit or JVD.   Pulmonary:      Effort: Pulmonary effort is normal. No respiratory distress.      Breath sounds: Normal breath sounds.   Cardiovascular:      Normal rate. Regular rhythm.   Edema:     Peripheral edema absent.   Skin:     General: Skin is cool.   Neurological:      Mental Status: Alert, oriented to person, place, and time and oriented to person, place and time.         Lab Results   Component Value Date     01/03/2024    K 4.0 01/03/2024     01/03/2024    CO2 26.0 01/03/2024    BUN 13 01/03/2024    CREATININE 1.21 01/03/2024    GLUCOSE 120 (H) 01/03/2024    CALCIUM 9.0 01/03/2024    AST 27 01/03/2024    ALT 35 01/03/2024    ALKPHOS 71 01/03/2024     Lab Results   Component Value Date    CKTOTAL 87 01/03/2024     Lab Results   Component Value Date    WBC 6.63 01/03/2024    HGB 14.5 01/03/2024    HCT 43.0 01/03/2024     01/03/2024     Lab Results   Component Value Date    INR 0.85 (L) 11/13/2023     Lab Results   Component Value Date    MG 2.3 03/03/2020     Lab Results   Component Value Date    TSH 1.610 03/03/2020    PSA 2.370 03/03/2020    TRIG 73 02/27/2024    HDL 40 02/27/2024    LDL 77 02/27/2024      No results found for: \"BNP\"    During this visit the following were done:  Labs Reviewed []    Labs " Ordered []    Radiology Reports Reviewed []    Radiology Ordered []    PCP Records Reviewed []    Referring Provider Records Reviewed []    ER Records Reviewed []    Hospital Records Reviewed []    History Obtained From Family []    Radiology Images Reviewed []    Other Reviewed []    Records Requested []       Procedures    Assessment & Plan    Diagnosis Plan   1. Coronary artery disease of native artery of native heart with stable angina pectoris        2. Hyperlipidemia LDL goal <70                 Recommendations:  Coronary artery disease  Overall doing excellent his symptoms continue to improve he is walking 2 miles continuously with only occasional discomfort during this walking.  Otherwise is asymptomatic.  I will continue with current regimen which includes Plavix and Eliquis.  Will continue with metoprolol succinate and Crestor.  Dyslipidemia  Significant improvement in LDL although technically above goal given this improvement we will continue current dose of Crestor.  He is reluctant for further medications and would like to avoid if possible.    Return in about 6 months (around 9/19/2024).    As always, I appreciate very much the opportunity to participate in the cardiovascular care of your patients.      With Best Regards,    Jaron Weston PA-C

## 2024-03-20 ENCOUNTER — OFFICE VISIT (OUTPATIENT)
Dept: FAMILY MEDICINE CLINIC | Facility: CLINIC | Age: 74
End: 2024-03-20
Payer: MEDICARE

## 2024-03-20 VITALS
OXYGEN SATURATION: 98 % | HEART RATE: 65 BPM | DIASTOLIC BLOOD PRESSURE: 74 MMHG | TEMPERATURE: 97.8 F | HEIGHT: 69 IN | WEIGHT: 159.2 LBS | SYSTOLIC BLOOD PRESSURE: 136 MMHG | BODY MASS INDEX: 23.58 KG/M2

## 2024-03-20 DIAGNOSIS — H69.92 EUSTACHIAN TUBE DYSFUNCTION, LEFT: Primary | ICD-10-CM

## 2024-03-20 DIAGNOSIS — H65.22 LEFT CHRONIC SEROUS OTITIS MEDIA: ICD-10-CM

## 2024-03-20 DIAGNOSIS — Z79.01 ANTICOAGULATED: ICD-10-CM

## 2024-03-20 DIAGNOSIS — H91.93 BILATERAL HEARING LOSS, UNSPECIFIED HEARING LOSS TYPE: ICD-10-CM

## 2024-03-20 RX ORDER — FLUTICASONE PROPIONATE 50 MCG
2 SPRAY, SUSPENSION (ML) NASAL
Qty: 18.2 ML | Refills: 5 | Status: SHIPPED | OUTPATIENT
Start: 2024-03-20

## 2024-03-20 NOTE — PROGRESS NOTES
Subjective   Jose Luis Ahuja is a 73 y.o. male.   Pt presents today with CC of Hearing Problem      History of Present Illness   History of Present Illness  1.  Patient is a 73-year-old male complaining of bilateral hearing loss.  He wears a hearing aid on his right ear that works well, and his left ear it multiples the sound and he feels that it makes it worse.  He would like evaluation of this.  #2 he is currently taking Eliquis for a DVT in his lower extremity about 4 months ago.  He has not had any problems with anticoagulation.  He follows with cardiology for this.       The following portions of the patient's history were reviewed and updated as appropriate: allergies, current medications, past family history, past medical history, past social history, past surgical history, and problem list.    Review of Systems   Constitutional:  Negative for chills, fever and unexpected weight loss.   HENT:  Negative for congestion and sore throat.    Eyes:  Negative for blurred vision and visual disturbance.   Respiratory:  Negative for cough and wheezing.    Cardiovascular:  Negative for chest pain and palpitations.   Gastrointestinal:  Negative for abdominal pain and diarrhea.   Endocrine: Negative for cold intolerance and heat intolerance.   Genitourinary:  Negative for dysuria.   Musculoskeletal:  Negative for arthralgias and neck stiffness.   Neurological:  Negative for dizziness, seizures and syncope.   Psychiatric/Behavioral:  Negative for self-injury, suicidal ideas and depressed mood.      Vitals:    03/20/24 0804   BP: 136/74   Pulse: 65   Temp: 97.8 °F (36.6 °C)   SpO2: 98%        Objective   Physical Exam  Vitals and nursing note reviewed.   Constitutional:       Appearance: He is well-developed.   HENT:      Head: Normocephalic and atraumatic.      Right Ear: External ear normal.      Left Ear: External ear normal.      Ears:      Comments: Left ear has serous fluid behind the TM with moderate bulging, serous  otitis media.  The landmarks are still visible, it is not bacterial or purulent.  Patient tube dysfunction strongly suspected.     Nose: Nose normal.   Eyes:      Conjunctiva/sclera: Conjunctivae normal.      Pupils: Pupils are equal, round, and reactive to light.   Cardiovascular:      Rate and Rhythm: Normal rate and regular rhythm.      Heart sounds: Normal heart sounds.   Pulmonary:      Effort: Pulmonary effort is normal.      Breath sounds: Normal breath sounds.   Abdominal:      General: Bowel sounds are normal.      Palpations: Abdomen is soft.   Musculoskeletal:      Cervical back: Normal range of motion and neck supple.   Skin:     General: Skin is warm and dry.   Neurological:      Mental Status: He is alert and oriented to person, place, and time.   Psychiatric:         Behavior: Behavior normal.           Assessment & Plan   Diagnoses and all orders for this visit:    1. Eustachian tube dysfunction, left (Primary)  -     fluticasone (FLONASE) 50 MCG/ACT nasal spray; 2 sprays into the nostril(s) as directed by provider Every Other Day.  Dispense: 18.2 mL; Refill: 5  Brandy technique for Eustachian tube drainage: With your head rotated to one side and one hand over the forehead, grasp, the angle of the jaw with your fingers and gently milk it forward in a rhythmic motion. This will tug on the Eustachian tube, which connects your middle ear to your throat, and help it to drain.  He is going to try Flonase 2 sprays every other day in each nostril.  If this does not work, he could try Claritin(not Claritin-D).    2. Left chronic serous otitis media  -     fluticasone (FLONASE) 50 MCG/ACT nasal spray; 2 sprays into the nostril(s) as directed by provider Every Other Day.  Dispense: 18.2 mL; Refill: 5    3. Bilateral hearing loss, unspecified hearing loss type  -     fluticasone (FLONASE) 50 MCG/ACT nasal spray; 2 sprays into the nostril(s) as directed by provider Every Other Day.  Dispense: 18.2 mL; Refill:  5    4. Anticoagulated  -     CBC No Differential; Future                    BMI is within normal parameters. No other follow-up for BMI required.          This document has been electronically signed by Cyndee Langley  March 20, 2024 08:07 EDT    Dictated Utilizing Dragon Dictation: Part of this note may be an electronic transcription/translation of spoken language to printed text using the Dragon Dictation System.

## 2024-03-25 ENCOUNTER — LAB (OUTPATIENT)
Dept: FAMILY MEDICINE CLINIC | Facility: CLINIC | Age: 74
End: 2024-03-25
Payer: MEDICARE

## 2024-03-25 DIAGNOSIS — Z79.01 ANTICOAGULATED: ICD-10-CM

## 2024-03-25 LAB
DEPRECATED RDW RBC AUTO: 41 FL (ref 37–54)
ERYTHROCYTE [DISTWIDTH] IN BLOOD BY AUTOMATED COUNT: 12.4 % (ref 12.3–15.4)
HCT VFR BLD AUTO: 43.7 % (ref 37.5–51)
HGB BLD-MCNC: 14.5 G/DL (ref 13–17.7)
MCH RBC QN AUTO: 30.1 PG (ref 26.6–33)
MCHC RBC AUTO-ENTMCNC: 33.2 G/DL (ref 31.5–35.7)
MCV RBC AUTO: 90.9 FL (ref 79–97)
PLATELET # BLD AUTO: 217 10*3/MM3 (ref 140–450)
PMV BLD AUTO: 10.9 FL (ref 6–12)
RBC # BLD AUTO: 4.81 10*6/MM3 (ref 4.14–5.8)
WBC NRBC COR # BLD AUTO: 6.89 10*3/MM3 (ref 3.4–10.8)

## 2024-03-25 PROCEDURE — 85027 COMPLETE CBC AUTOMATED: CPT | Performed by: FAMILY MEDICINE

## 2024-03-25 PROCEDURE — 36415 COLL VENOUS BLD VENIPUNCTURE: CPT

## 2024-03-27 ENCOUNTER — TELEPHONE (OUTPATIENT)
Dept: FAMILY MEDICINE CLINIC | Facility: CLINIC | Age: 74
End: 2024-03-27
Payer: MEDICARE

## 2024-03-27 NOTE — TELEPHONE ENCOUNTER
----- Message from Sekou Mcmanus DO sent at 3/27/2024  1:17 PM EDT -----  Blood counts are stable.  No concerns

## 2024-03-28 NOTE — TELEPHONE ENCOUNTER
"Name: Jose Luis Ahuja \"Dale\"    Relationship: Self    Best Callback Number: [phone]     HUB PROVIDED THE RELAY MESSAGE FROM THE OFFICE    PATIENT: VOICED UNDERSTANDING AND HAS NO FURTHER QUESTIONS AT THIS TIME        "

## 2024-04-22 ENCOUNTER — TELEPHONE (OUTPATIENT)
Dept: CARDIOLOGY | Facility: CLINIC | Age: 74
End: 2024-04-22
Payer: MEDICARE

## 2024-04-22 RX ORDER — METOPROLOL SUCCINATE 25 MG/1
12.5 TABLET, EXTENDED RELEASE ORAL DAILY
Qty: 30 TABLET | Refills: 3 | Status: SHIPPED | OUTPATIENT
Start: 2024-04-22

## 2024-07-02 ENCOUNTER — OFFICE VISIT (OUTPATIENT)
Dept: FAMILY MEDICINE CLINIC | Facility: CLINIC | Age: 74
End: 2024-07-02
Payer: MEDICARE

## 2024-07-02 VITALS
TEMPERATURE: 98 F | WEIGHT: 160.8 LBS | OXYGEN SATURATION: 98 % | HEIGHT: 69 IN | BODY MASS INDEX: 23.82 KG/M2 | SYSTOLIC BLOOD PRESSURE: 132 MMHG | HEART RATE: 76 BPM | DIASTOLIC BLOOD PRESSURE: 72 MMHG

## 2024-07-02 DIAGNOSIS — Z00.00 MEDICARE ANNUAL WELLNESS VISIT, SUBSEQUENT: Primary | ICD-10-CM

## 2024-07-02 DIAGNOSIS — E78.5 HYPERLIPIDEMIA LDL GOAL <70: ICD-10-CM

## 2024-07-02 DIAGNOSIS — Z79.01 ANTICOAGULATED: ICD-10-CM

## 2024-07-02 DIAGNOSIS — I25.118 CORONARY ARTERY DISEASE OF NATIVE ARTERY OF NATIVE HEART WITH STABLE ANGINA PECTORIS: ICD-10-CM

## 2024-07-02 DIAGNOSIS — I82.4Y1 DEEP VEIN THROMBOSIS (DVT) OF PROXIMAL VEIN OF RIGHT LOWER EXTREMITY, UNSPECIFIED CHRONICITY: ICD-10-CM

## 2024-07-02 PROCEDURE — G0439 PPPS, SUBSEQ VISIT: HCPCS | Performed by: FAMILY MEDICINE

## 2024-07-02 PROCEDURE — 1159F MED LIST DOCD IN RCRD: CPT | Performed by: FAMILY MEDICINE

## 2024-07-02 PROCEDURE — 1126F AMNT PAIN NOTED NONE PRSNT: CPT | Performed by: FAMILY MEDICINE

## 2024-07-02 PROCEDURE — 1170F FXNL STATUS ASSESSED: CPT | Performed by: FAMILY MEDICINE

## 2024-07-02 PROCEDURE — 1160F RVW MEDS BY RX/DR IN RCRD: CPT | Performed by: FAMILY MEDICINE

## 2024-07-02 RX ORDER — METOPROLOL SUCCINATE 25 MG/1
12.5 TABLET, EXTENDED RELEASE ORAL DAILY
Qty: 45 TABLET | Refills: 3 | Status: SHIPPED | OUTPATIENT
Start: 2024-07-02

## 2024-07-02 RX ORDER — ROSUVASTATIN CALCIUM 20 MG/1
20 TABLET, COATED ORAL DAILY
Qty: 90 TABLET | Refills: 3 | Status: SHIPPED | OUTPATIENT
Start: 2024-07-02

## 2024-07-02 RX ORDER — CLOPIDOGREL BISULFATE 75 MG/1
75 TABLET ORAL EVERY EVENING
Qty: 90 TABLET | Refills: 1 | Status: SHIPPED | OUTPATIENT
Start: 2024-07-02

## 2024-07-02 NOTE — PROGRESS NOTES
The ABCs of the Annual Wellness Visit  Subsequent Medicare Wellness Visit    Subjective      Jose Luis Ahuja is a 74 y.o. male who presents for a Subsequent Medicare Wellness Visit.    The following portions of the patient's history were reviewed and   updated as appropriate: allergies, current medications, past family history, past medical history, past social history, past surgical history, and problem list.    Compared to one year ago, the patient feels his physical   health is the same.    Compared to one year ago, the patient feels his mental   health is the same.    Recent Hospitalizations:  He was not admitted to the hospital during the last year.       Current Medical Providers:  Patient Care Team:  Sekou Mcmanus DO as PCP - General (Family Medicine)  Regino Vazquez MD as Consulting Physician (Cardiology)  Ankush West MD as Consulting Physician (Cardiology)    Outpatient Medications Prior to Visit   Medication Sig Dispense Refill    apixaban (ELIQUIS) 5 MG tablet tablet Take 1 tablet by mouth 2 (Two) Times a Day. 60 tablet 4    cholecalciferol (VITAMIN D3) 400 units tablet Take 1 tablet by mouth Daily.      clopidogrel (PLAVIX) 75 MG tablet Take 1 tablet by mouth Daily. (Patient taking differently: Take 1 tablet by mouth Every Evening.) 30 tablet 11    GARLIC OIL PO Take  by mouth Daily.      metoprolol succinate XL (TOPROL-XL) 25 MG 24 hr tablet TAKE 1/2 TABLET BY MOUTH ONCE DAILY 30 tablet 3    rosuvastatin (CRESTOR) 20 MG tablet Take 1 tablet by mouth Daily. 90 tablet 3    vitamin E 400 UNIT capsule Take 1 capsule by mouth Daily.      nitroglycerin (NITROSTAT) 0.4 MG SL tablet 1 under the tongue as needed for angina, may repeat q5mins for up three doses (Patient not taking: Reported on 7/2/2024) 100 tablet 11    Omega-3 Fatty Acids (FISH OIL) 1000 MG capsule capsule Take 1,200 mg by mouth Daily With Breakfast.      fluticasone (FLONASE) 50 MCG/ACT nasal spray 2 sprays into the nostril(s) as  "directed by provider Every Other Day. 18.2 mL 5    isosorbide mononitrate (IMDUR) 30 MG 24 hr tablet Take 1 tablet by mouth Daily. 30 tablet 11     No facility-administered medications prior to visit.       No opioid medication identified on active medication list. I have reviewed chart for other potential  high risk medication/s and harmful drug interactions in the elderly.        Aspirin is not on active medication list.  Aspirin use is not indicated based on review of current medical condition/s. Risk of harm outweighs potential benefits.  .    Patient Active Problem List   Diagnosis    Hyperlipidemia LDL goal <70    Elevated blood pressure reading    Vitamin D deficiency disease    Coronary artery disease involving native coronary artery of native heart     Advance Care Planning   Advance Care Planning     Advance Directive is on file.  ACP discussion was held with the patient during this visit. Patient has an advance directive in EMR which is still valid.      Objective    Vitals:    07/02/24 1046   BP: 132/72   BP Location: Right arm   Patient Position: Sitting   Pulse: 76   Temp: 98 °F (36.7 °C)   TempSrc: Temporal   SpO2: 98%   Weight: 72.9 kg (160 lb 12.8 oz)   Height: 175.3 cm (69\")   PainSc: 0-No pain     Estimated body mass index is 23.75 kg/m² as calculated from the following:    Height as of this encounter: 175.3 cm (69\").    Weight as of this encounter: 72.9 kg (160 lb 12.8 oz).    BMI is within normal parameters. No other follow-up for BMI required.      Does the patient have evidence of cognitive impairment?   No            HEALTH RISK ASSESSMENT    Smoking Status:  Social History     Tobacco Use   Smoking Status Never    Passive exposure: Past   Smokeless Tobacco Never     Alcohol Consumption:  Social History     Substance and Sexual Activity   Alcohol Use No     Fall Risk Screen:    STEADI Fall Risk Assessment was completed, and patient is at LOW risk for falls.Assessment completed " on:2024    Depression Screenin/2/2024    10:46 AM   PHQ-2/PHQ-9 Depression Screening   Little Interest or Pleasure in Doing Things 0-->not at all   Feeling Down, Depressed or Hopeless 0-->not at all   PHQ-9: Brief Depression Severity Measure Score 0       Health Habits and Functional and Cognitive Screenin/2/2024    10:44 AM   Functional & Cognitive Status   Do you have difficulty preparing food and eating? No   Do you have difficulty bathing yourself, getting dressed or grooming yourself? No   Do you have difficulty using the toilet? No   Do you have difficulty moving around from place to place? No   Do you have trouble with steps or getting out of a bed or a chair? No   Current Diet Well Balanced Diet   Dental Exam Not up to date   Eye Exam Up to date   Exercise (times per week) 7 times per week   Current Exercises Include Walking   Do you need help using the phone?  No   Are you deaf or do you have serious difficulty hearing?  No   Do you need help to go to places out of walking distance? No   Do you need help shopping? No   Do you need help preparing meals?  No   Do you need help with housework?  No   Do you need help with laundry? No   Do you need help taking your medications? No   Do you need help managing money? No   Do you ever drive or ride in a car without wearing a seat belt? No   Have you felt unusual stress, anger or loneliness in the last month? No   Who do you live with? Spouse   If you need help, do you have trouble finding someone available to you? No   Have you been bothered in the last four weeks by sexual problems? No   Do you have difficulty concentrating, remembering or making decisions? No       Age-appropriate Screening Schedule:  Refer to the list below for future screening recommendations based on patient's age, sex and/or medical conditions. Orders for these recommended tests are listed in the plan section. The patient has been provided with a written plan.    Health  Maintenance   Topic Date Due    Pneumococcal Vaccine 65+ (1 of 2 - PCV) Never done    ZOSTER VACCINE (1 of 2) Never done    RSV Vaccine - Adults (1 - 1-dose 60+ series) Never done    HEPATITIS C SCREENING  Never done    COVID-19 Vaccine (6 - 2023-24 season) 09/01/2023    ANNUAL WELLNESS VISIT  06/20/2024    INFLUENZA VACCINE  08/01/2024    LIPID PANEL  02/27/2025    TDAP/TD VACCINES (2 - Td or Tdap) 06/17/2026    COLORECTAL CANCER SCREENING  06/14/2029                  CMS Preventative Services Quick Reference  Risk Factors Identified During Encounter:    Immunizations Discussed/Encouraged: Influenza    The above risks/problems have been discussed with the patient.  Pertinent information has been shared with the patient in the After Visit Summary.    Diagnoses and all orders for this visit:    1. Medicare annual wellness visit, subsequent (Primary)  No major concerns.  His advance directive is in place.  Most notable is that he does not want any type of blood product.  This causes a problem as he is anticoagulated with Eliquis, and he questions the validity of the indication.  He had a DVT at the age of 73 years, for the first time in his life, 3 days after heart cath.  He is aware of the indication for Eliquis for a nonprovoked DVT, however, he has completed treatment, and he feels as though 3 days after a heart cath is suspicious that it was induced by this.  It would be a considerable coincidence for it not to be related, however, the literature does not suggest a significant incidence of DVT after heart cath, and anatomical logic suggest to me that there is no connection.  I recommend speaking with hematology and get their opinion.    2. Hyperlipidemia LDL goal <70    3. Coronary artery disease of native artery of native heart with stable angina pectoris  -     apixaban (ELIQUIS) 5 MG tablet tablet; Take 1 tablet by mouth 2 (Two) Times a Day.  Dispense: 180 tablet; Refill: 0  -     clopidogrel (PLAVIX) 75 MG  tablet; Take 1 tablet by mouth Every Evening.  Dispense: 90 tablet; Refill: 1  -     metoprolol succinate XL (TOPROL-XL) 25 MG 24 hr tablet; Take 0.5 tablets by mouth Daily.  Dispense: 45 tablet; Refill: 3  -     rosuvastatin (CRESTOR) 20 MG tablet; Take 1 tablet by mouth Daily.  Dispense: 90 tablet; Refill: 3    4. Anticoagulated  -     Ambulatory Referral to Hematology    5. Deep vein thrombosis (DVT) of proximal vein of right lower extremity, unspecified chronicity  -     apixaban (ELIQUIS) 5 MG tablet tablet; Take 1 tablet by mouth 2 (Two) Times a Day.  Dispense: 180 tablet; Refill: 0  -     Ambulatory Referral to Hematology  As above, as he is anticoagulated, and his Islam prohibits the use of blood products, hemorrhage could be deadly.  He is going to speak with hematology.      Follow Up:   Next Medicare Wellness visit to be scheduled in 1 year.      An After Visit Summary and PPPS were made available to the patient.

## 2024-07-16 NOTE — PROGRESS NOTES
Subjective     Date: 7/25/2024    Referring Provider  Sekou Mcmanus DO    Chief Complaint  Deep vein thrombosis (DVT) of proximal vein of right lower extremity, unspecified chronicity     Subjective          Jose Luis Ahuja is a 74 y.o. male who presents today to [facility] for initial evaluation .    HPI:   74 y.o. male with past medical history of coronary artery disease status post stent, diverticulosis, DVT of the right lower extremity following cardiac catheterization, Yazidi who does not accept blood products presents today for discussion regarding anticoagulation.    Mr. Ahuja underwent cardiac catheterization on 11/9/2023, an outpatient procedure, without any complications.  He had undergone cardiac catheterization 10/31/2023 which was complicated by bleed from wrist.  Following cardiac cath in November, he reports decreased mobility, due to the fear of excessive bleeding from right groin.    On 11/12/2023, he noted increased swelling of his right lower extremity, presented to [facility] emergency room.  Ultrasound of lower extremity showed positive for deep venous thrombosis in right common femoral and proximal superficial femoral veins.  CT angio of the chest has been negative for pulmonary embolism.    Mr. Ahuja presents today due to completion of anticoagulation for greater than 6 months, he has reservations about continuing anticoagulation therapy indefinitely.  He has history of diverticulosis, with intermittent flares and bleeding.  Most recently over the past weekend, where he has held Eliquis.  He is also a practicing Yazidi, does not accept blood products.    No previous history of venous thromboemboli.  Does not smoke, drink, use drugs.  Currently retired, is very active, previously worked as an owner of a cleaning company.  He denies any recent weight loss, fevers, chills, night sweats, bony pain.  Last  colonoscopy approximately 5 years ago, was reportedly normal by Dr. Aggarwal.    Family history significant for mother and father with cardiac history, siblings with cardiac disease.    The following portions of the patient's history were reviewed and updated as appropriate: allergies, current medications, past family history, past medical history, past social history, past surgical history and problem list.    Objective     Objective     Allergy:   No Known Allergies     Current Medications:   Current Outpatient Medications   Medication Sig Dispense Refill    apixaban (ELIQUIS) 5 MG tablet tablet Take 1 tablet by mouth 2 (Two) Times a Day. 180 tablet 0    cholecalciferol (VITAMIN D3) 400 units tablet Take 1 tablet by mouth Daily.      clopidogrel (PLAVIX) 75 MG tablet Take 1 tablet by mouth Every Evening. 90 tablet 1    GARLIC OIL PO Take  by mouth Daily.      metoprolol succinate XL (TOPROL-XL) 25 MG 24 hr tablet Take 0.5 tablets by mouth Daily. 45 tablet 3    Omega-3 Fatty Acids (FISH OIL) 1000 MG capsule capsule Take 1,200 mg by mouth Daily With Breakfast.      rosuvastatin (CRESTOR) 20 MG tablet Take 1 tablet by mouth Daily. 90 tablet 3    vitamin E 400 UNIT capsule Take 1 capsule by mouth Daily.      nitroglycerin (NITROSTAT) 0.4 MG SL tablet 1 under the tongue as needed for angina, may repeat q5mins for up three doses (Patient not taking: Reported on 7/2/2024) 100 tablet 11     No current facility-administered medications for this visit.       Past Medical History:  Past Medical History:   Diagnosis Date    Abnormal ECG     Arrhythmia     Cataract 04/03/23    Colon polyps     Coronary artery disease     Deep vein thrombosis     Diverticulosis ?    Glaucoma 11/2/22    HL (hearing loss)     Hyperlipidemia     Skin cancer     Vitamin D deficiency        Past Surgical History:  Past Surgical History:   Procedure Laterality Date    CARDIAC CATHETERIZATION N/A 10/31/2023    Procedure: Left Heart Cath;  Surgeon:  "Ankush West MD;  Location:  [facility];  Service: Cardiovascular;  Laterality: N/A;    CARDIAC CATHETERIZATION N/A 11/09/2023    Procedure: Percutaneous Coronary Intervention;  Surgeon: Ankush West MD;  Location:  [facility];  Service: Cardiovascular;  Laterality: N/A;    CARDIAC CATHETERIZATION N/A 11/09/2023    Procedure: Optical Coherence Tomography;  Surgeon: Ankush West MD;  Location:  [facility];  Service: Cardiovascular;  Laterality: N/A;    COLONOSCOPY  06/14/2019    Dr. Martines     CORONARY STENT PLACEMENT      EYE SURGERY      SKIN CANCER EXCISION      Mult.        Social History:  Social History     Socioeconomic History    Marital status:    Tobacco Use    Smoking status: Never     Passive exposure: Past    Smokeless tobacco: Never   Vaping Use    Vaping status: Never Used   Substance and Sexual Activity    Alcohol use: No    Drug use: No    Sexual activity: Not Currently     Partners: Female         Family History:  Family History   Problem Relation Age of Onset    Heart disease Mother     Heart failure Mother     Liver cancer Father     Heart attack Father     Cancer Father        Review of Systems:  Review of Systems   All other systems reviewed and are negative.      Vital Signs:   /83   Pulse 56   Temp 96.9 °F (36.1 °C) (Temporal)   Resp 18   Ht 175.3 cm (69\")   Wt 71.3 kg (157 lb 3.2 oz)   SpO2 98%   BMI 23.21 kg/m²      Physical Exam:  Physical Exam  Vitals reviewed.   Constitutional:       General: He is not in acute distress.     Appearance: Normal appearance. He is not ill-appearing.   HENT:      Head: Normocephalic and atraumatic.      Mouth/Throat:      Mouth: Mucous membranes are moist.      Pharynx: Oropharynx is clear.   Eyes:      Conjunctiva/sclera: Conjunctivae normal.      Pupils: Pupils are equal, round, and reactive to light.   Cardiovascular:      Rate and Rhythm: Normal rate and regular rhythm. " "     Heart sounds: No murmur heard.  Pulmonary:      Effort: Pulmonary effort is normal. No respiratory distress.      Breath sounds: Normal breath sounds. No wheezing.   Abdominal:      General: Abdomen is flat. Bowel sounds are normal. There is no distension.      Palpations: Abdomen is soft. There is no mass.      Tenderness: There is no abdominal tenderness. There is no guarding.   Musculoskeletal:         General: No swelling. Normal range of motion.      Cervical back: Normal range of motion.   Lymphadenopathy:      Cervical: No cervical adenopathy.   Skin:     General: Skin is warm and dry.   Neurological:      General: No focal deficit present.      Mental Status: He is alert and oriented to person, place, and time. Mental status is at baseline.   Psychiatric:         Mood and Affect: Mood normal.         PHQ-9 Score  PHQ-9 Total Score:       Pain Score  Vitals:    07/25/24 1100   BP: 151/83   Pulse: 56   Resp: 18   Temp: 96.9 °F (36.1 °C)   TempSrc: Temporal   SpO2: 98%   Weight: 71.3 kg (157 lb 3.2 oz)   Height: 175.3 cm (69\")   PainSc: 0-No pain       ECOG score: 0               Lab Review  Lab Results   Component Value Date    WBC 6.89 03/25/2024    HGB 14.5 03/25/2024    HCT 43.7 03/25/2024    MCV 90.9 03/25/2024    RDW 12.4 03/25/2024     03/25/2024    NEUTRORELPCT 66.2 11/13/2023    LYMPHORELPCT 21.3 11/13/2023    MONORELPCT 8.6 11/13/2023    EOSRELPCT 3.1 11/13/2023    BASORELPCT 0.4 11/13/2023    NEUTROABS 6.65 11/13/2023    LYMPHSABS 2.14 11/13/2023     Lab Results   Component Value Date     01/03/2024    K 4.0 01/03/2024    CO2 26.0 01/03/2024     01/03/2024    BUN 13 01/03/2024    CREATININE 1.21 01/03/2024    EGFRIFNONA 67 03/03/2020    GLUCOSE 120 (H) 01/03/2024    CALCIUM 9.0 01/03/2024    ALKPHOS 71 01/03/2024    AST 27 01/03/2024    ALT 35 01/03/2024    BILITOT 0.4 01/03/2024    ALBUMIN 4.5 01/03/2024    PROTEINTOT 6.6 01/03/2024    MG 2.3 03/03/2020       Radiology " Results  CT Angiogram Chest Pulmonary Embolism (11/13/2023 04:48)   FINDINGS:  Contrast bolus timing is satisfactory to evaluate for pulmonary  embolism.  No pulmonary arterial filling defect is seen.  Pulmonary arteries: normal.  Negative for pulmonary embolism.  Heart and pericardium: normal.  Aorta: normal.  Lungs: A calcified granuloma is noted in the periphery of the right  lower lobe.  Lungs are otherwise clear.  Airways: normal.  Pleura: normal.  Lymph nodes: normal.  Musculoskeletal: no acute abnormality.  Esophagus: normal.  Upper abdomen: Scattered colonic diverticula.  Calcified granuloma in  the spleen.  Other: n/a     IMPRESSION:   NEGATIVE FOR PULMONARY EMBOLISM.  NO ACUTE ABNORMALITY IN THE CHEST.    US Venous Doppler Lower Extremity Right (duplex) (11/13/2023 01:19)   FINDINGS:   The right common femoral vein is expanded and noncompressible,  and has no flow with duplex Doppler.  Similar findings are seen in the  proximal superficial femoral vein.  Popliteal vein is patent,  compressible, and have normal augmented waveforms.  The peroneal and  posterior tibial veins are overall patent.     IMPRESSION:  POSITIVE FOR DEEP VENOUS THROMBOSIS IN THE RIGHT COMMON FEMORAL AND  PROXIMAL SUPERFICIAL FEMORAL VEINS.    Assessment / Plan         Assessment and Plan   Jose Luis Ahuja is a 74 y.o. presents for     Venous thromboembolism RLE  -Patient underwent outpatient cardiac catheterization from right groin 11/9/2023, presented with right common femoral and proximal superficial femoral vein DVT 11/13/23.  CT angio of the chest without pulmonary emboli. Has been on Eliquis 5 mg BID  -No previous history of venous thromboembolism.  No family history of venous thromboembolism  -He is a Religion does not accept blood products;   -Denies recent unintentional weight loss, fever, chills, night sweats, lymphadenopathy.  Normally very active.  Last colonoscopy about 5 years ago with Dr. Pereyra, reportedly  normal.  -He does have a history of diverticulosis, with intermittent flares.  Worried about continuation of Eliquis with Plavix, which was recommended due to unprovoked venous thromboemboli. However, if he does have excessive bleeds, he would be unable to receive blood products.   -We discussed all of the above today, given this was his first DVT, after first procedure on the same leg, but my inclination is, this is likely provoked.   I would still screen him for underlying thrombophilia, to assess risk factors, although unlikely.  Will also re-order U/S duplex of RLE  - Check for Factor V Leiden, lupus anticoagulant, beta 2 glycoprotein antibody, anticardiolipin antibody, antithrombin, prothrombin, protein C and S           Discussed possible differential diagnoses, testing, treatment, recommended non-pharmacological interventions, risks, warning signs to monitor for that would indicate need for follow-up in clinic or ER. If no improvement with these regimens or you have new or worsening symptoms follow-up. Patient verbalizes understanding and agreement with plan of care. Denies further needs or concerns.     Patient was given instructions and counseling regarding her condition and for health maintenance advised.       All questions were answered to his satisfaction.    BMI is within normal parameters. No other follow-up for BMI required.         Meds ordered during this visit  No orders of the defined types were placed in this encounter.      Visit Diagnoses    ICD-10-CM ICD-9-CM   1. Chronic deep vein thrombosis (DVT) of proximal vein of right lower extremity  I82.5Y1 453.51       Follow Up   In 3 weeks to discuss above work up; FU U/S duplex RLE        This document has been electronically signed by Judy Rossi MD   July 25, 2024 11:50 EDT    Dictated Utilizing Dragon Dictation: Part of this note may be an electronic transcription/translation of spoken language to printed text using the Dragon Dictation  System.

## 2024-07-25 ENCOUNTER — CONSULT (OUTPATIENT)
Dept: ONCOLOGY | Facility: CLINIC | Age: 74
End: 2024-07-25
Payer: MEDICARE

## 2024-07-25 ENCOUNTER — LAB (OUTPATIENT)
Dept: ONCOLOGY | Facility: CLINIC | Age: 74
End: 2024-07-25
Payer: MEDICARE

## 2024-07-25 VITALS
SYSTOLIC BLOOD PRESSURE: 151 MMHG | BODY MASS INDEX: 23.28 KG/M2 | HEIGHT: 69 IN | RESPIRATION RATE: 18 BRPM | OXYGEN SATURATION: 98 % | WEIGHT: 157.2 LBS | TEMPERATURE: 96.9 F | HEART RATE: 56 BPM | DIASTOLIC BLOOD PRESSURE: 83 MMHG

## 2024-07-25 DIAGNOSIS — I82.5Y1 CHRONIC DEEP VEIN THROMBOSIS (DVT) OF PROXIMAL VEIN OF RIGHT LOWER EXTREMITY: Primary | ICD-10-CM

## 2024-07-25 DIAGNOSIS — I82.5Y1 CHRONIC DEEP VEIN THROMBOSIS (DVT) OF PROXIMAL VEIN OF RIGHT LOWER EXTREMITY: ICD-10-CM

## 2024-07-25 LAB
BASOPHILS # BLD AUTO: 0.02 10*3/MM3 (ref 0–0.2)
BASOPHILS NFR BLD AUTO: 0.3 % (ref 0–1.5)
DEPRECATED RDW RBC AUTO: 41.5 FL (ref 37–54)
EOSINOPHIL # BLD AUTO: 0.12 10*3/MM3 (ref 0–0.4)
EOSINOPHIL NFR BLD AUTO: 2 % (ref 0.3–6.2)
ERYTHROCYTE [DISTWIDTH] IN BLOOD BY AUTOMATED COUNT: 12.5 % (ref 12.3–15.4)
HCT VFR BLD AUTO: 45.5 % (ref 37.5–51)
HGB BLD-MCNC: 14.9 G/DL (ref 13–17.7)
IMM GRANULOCYTES # BLD AUTO: 0.02 10*3/MM3 (ref 0–0.05)
IMM GRANULOCYTES NFR BLD AUTO: 0.3 % (ref 0–0.5)
LYMPHOCYTES # BLD AUTO: 1.76 10*3/MM3 (ref 0.7–3.1)
LYMPHOCYTES NFR BLD AUTO: 28.8 % (ref 19.6–45.3)
MCH RBC QN AUTO: 29.9 PG (ref 26.6–33)
MCHC RBC AUTO-ENTMCNC: 32.7 G/DL (ref 31.5–35.7)
MCV RBC AUTO: 91.4 FL (ref 79–97)
MONOCYTES # BLD AUTO: 0.51 10*3/MM3 (ref 0.1–0.9)
MONOCYTES NFR BLD AUTO: 8.3 % (ref 5–12)
NEUTROPHILS NFR BLD AUTO: 3.69 10*3/MM3 (ref 1.7–7)
NEUTROPHILS NFR BLD AUTO: 60.3 % (ref 42.7–76)
NRBC BLD AUTO-RTO: 0 /100 WBC (ref 0–0.2)
PLATELET # BLD AUTO: 241 10*3/MM3 (ref 140–450)
PMV BLD AUTO: 10.1 FL (ref 6–12)
RBC # BLD AUTO: 4.98 10*6/MM3 (ref 4.14–5.8)
WBC NRBC COR # BLD AUTO: 6.12 10*3/MM3 (ref 3.4–10.8)

## 2024-07-25 PROCEDURE — 86148 ANTI-PHOSPHOLIPID ANTIBODY: CPT | Performed by: INTERNAL MEDICINE

## 2024-07-25 PROCEDURE — 81241 F5 GENE: CPT | Performed by: INTERNAL MEDICINE

## 2024-07-25 PROCEDURE — 85306 CLOT INHIBIT PROT S FREE: CPT | Performed by: INTERNAL MEDICINE

## 2024-07-25 PROCEDURE — 85301 ANTITHROMBIN III ANTIGEN: CPT | Performed by: INTERNAL MEDICINE

## 2024-07-25 PROCEDURE — 85305 CLOT INHIBIT PROT S TOTAL: CPT | Performed by: INTERNAL MEDICINE

## 2024-07-25 PROCEDURE — 99204 OFFICE O/P NEW MOD 45 MIN: CPT | Performed by: INTERNAL MEDICINE

## 2024-07-25 PROCEDURE — 1126F AMNT PAIN NOTED NONE PRSNT: CPT | Performed by: INTERNAL MEDICINE

## 2024-07-25 PROCEDURE — 85732 THROMBOPLASTIN TIME PARTIAL: CPT | Performed by: INTERNAL MEDICINE

## 2024-07-25 PROCEDURE — 85025 COMPLETE CBC W/AUTO DIFF WBC: CPT | Performed by: INTERNAL MEDICINE

## 2024-07-25 PROCEDURE — 85613 RUSSELL VIPER VENOM DILUTED: CPT | Performed by: INTERNAL MEDICINE

## 2024-07-25 PROCEDURE — 81240 F2 GENE: CPT | Performed by: INTERNAL MEDICINE

## 2024-07-25 PROCEDURE — 85302 CLOT INHIBIT PROT C ANTIGEN: CPT | Performed by: INTERNAL MEDICINE

## 2024-07-25 PROCEDURE — 85303 CLOT INHIBIT PROT C ACTIVITY: CPT | Performed by: INTERNAL MEDICINE

## 2024-07-25 PROCEDURE — 86147 CARDIOLIPIN ANTIBODY EA IG: CPT | Performed by: INTERNAL MEDICINE

## 2024-07-25 PROCEDURE — 85300 ANTITHROMBIN III ACTIVITY: CPT | Performed by: INTERNAL MEDICINE

## 2024-07-25 PROCEDURE — 86146 BETA-2 GLYCOPROTEIN ANTIBODY: CPT | Performed by: INTERNAL MEDICINE

## 2024-07-25 NOTE — PROGRESS NOTES
Venipuncture Blood Specimen Collection  Venipuncture performed in right arm by Billy Alvarado MA with good hemostasis. Patient tolerated the procedure well without complications.   07/25/24   Billy Alvarado MA

## 2024-07-26 LAB
B2 GLYCOPROT1 IGA SER-ACNC: <9 GPI IGA UNITS (ref 0–25)
B2 GLYCOPROT1 IGG SER-ACNC: <9 GPI IGG UNITS (ref 0–20)
B2 GLYCOPROT1 IGM SER-ACNC: <9 GPI IGM UNITS (ref 0–32)
F5 GENE MUT ANL BLD/T: NORMAL
FACTOR II, DNA ANALYSIS: NORMAL

## 2024-07-27 LAB
AT III ACT/NOR PPP CHRO: 96 % (ref 75–135)
AT III AG ACT/NOR PPP IA: 84 % (ref 72–124)
LA 2 SCREEN W REFLEX-IMP: NORMAL
PROT C ACT/NOR PPP: 128 % (ref 73–180)
PROT S ACT/NOR PPP: 81 % (ref 63–140)
SCREEN APTT: 37.1 SEC (ref 0–43.5)
SCREEN DRVVT: 28.5 SEC (ref 0–47)

## 2024-07-29 ENCOUNTER — PATIENT ROUNDING (BHMG ONLY) (OUTPATIENT)
Dept: ONCOLOGY | Facility: CLINIC | Age: 74
End: 2024-07-29
Payer: MEDICARE

## 2024-07-30 DIAGNOSIS — I82.5Y1 CHRONIC DEEP VEIN THROMBOSIS (DVT) OF PROXIMAL VEIN OF RIGHT LOWER EXTREMITY: Primary | ICD-10-CM

## 2024-07-30 LAB
CARDIOLIPIN IGA SER IA-ACNC: <9 APL U/ML (ref 0–11)
CARDIOLIPIN IGG SER IA-ACNC: <9 GPL U/ML (ref 0–14)
CARDIOLIPIN IGM SER IA-ACNC: 14 MPL U/ML (ref 0–12)
PS IGA SER-ACNC: 1 APS UNITS (ref 0–19)
PS IGG SER-ACNC: 9 UNITS (ref 0–30)
PS IGM SER-ACNC: 22 UNITS (ref 0–30)

## 2024-07-31 LAB
PROT C AG ACT/NOR PPP IA: 127 % (ref 60–150)
PROT S AG ACT/NOR PPP IA: 65 % (ref 60–150)
PROT S FREE AG ACT/NOR PPP IA: 84 % (ref 61–136)

## 2024-08-16 ENCOUNTER — TELEPHONE (OUTPATIENT)
Dept: FAMILY MEDICINE CLINIC | Facility: CLINIC | Age: 74
End: 2024-08-16
Payer: MEDICARE

## 2024-08-16 DIAGNOSIS — I25.118 CORONARY ARTERY DISEASE OF NATIVE ARTERY OF NATIVE HEART WITH STABLE ANGINA PECTORIS: ICD-10-CM

## 2024-08-16 RX ORDER — ROSUVASTATIN CALCIUM 10 MG/1
10 TABLET, COATED ORAL DAILY
Qty: 90 TABLET | Refills: 3 | Status: SHIPPED | OUTPATIENT
Start: 2024-08-16

## 2024-08-16 NOTE — TELEPHONE ENCOUNTER
That is reasonable.  I sent in a new prescription for 10 mg Crestor.  In the future, we may need to discuss other options to ensure adequate control.  If the reduction in Crestor does not improve your symptoms, follow-up.

## 2024-08-23 ENCOUNTER — TELEPHONE (OUTPATIENT)
Dept: ONCOLOGY | Facility: CLINIC | Age: 74
End: 2024-08-23

## 2024-08-30 ENCOUNTER — HOSPITAL ENCOUNTER (OUTPATIENT)
Facility: HOSPITAL | Age: 74
Discharge: HOME | End: 2024-08-30
Payer: MEDICARE

## 2024-08-30 DIAGNOSIS — I82.5Y1 CHRONIC DEEP VEIN THROMBOSIS (DVT) OF PROXIMAL VEIN OF RIGHT LOWER EXTREMITY: ICD-10-CM

## 2024-08-30 PROCEDURE — 93971 EXTREMITY STUDY: CPT

## 2024-09-03 ENCOUNTER — OFFICE VISIT (OUTPATIENT)
Dept: ONCOLOGY | Facility: CLINIC | Age: 74
End: 2024-09-03
Payer: MEDICARE

## 2024-09-03 ENCOUNTER — LAB (OUTPATIENT)
Dept: ONCOLOGY | Facility: CLINIC | Age: 74
End: 2024-09-03
Payer: MEDICARE

## 2024-09-03 VITALS
RESPIRATION RATE: 16 BRPM | DIASTOLIC BLOOD PRESSURE: 80 MMHG | HEART RATE: 60 BPM | WEIGHT: 157 LBS | BODY MASS INDEX: 23.25 KG/M2 | SYSTOLIC BLOOD PRESSURE: 137 MMHG | OXYGEN SATURATION: 98 % | HEIGHT: 69 IN | TEMPERATURE: 97.1 F

## 2024-09-03 DIAGNOSIS — I82.5Y1 CHRONIC DEEP VEIN THROMBOSIS (DVT) OF PROXIMAL VEIN OF RIGHT LOWER EXTREMITY: Primary | ICD-10-CM

## 2024-09-03 PROCEDURE — 99214 OFFICE O/P EST MOD 30 MIN: CPT | Performed by: INTERNAL MEDICINE

## 2024-09-03 PROCEDURE — 1126F AMNT PAIN NOTED NONE PRSNT: CPT | Performed by: INTERNAL MEDICINE

## 2024-09-03 NOTE — PROGRESS NOTES
Subjective     Date: 9/3/2024    Referring Provider  No ref. provider found    Chief Complaint  Deep vein thrombosis (DVT) of proximal vein of right lower extremity, unspecified chronicity     Subjective          Jose Luis Ahuja is a 74 y.o. male who presents today to [facility] for follow up.    HPI:   74 y.o. 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    Mr. Ahuja underwent cardiac catheterization on 11/9/2023, an outpatient procedure, without any complications.  He had undergone cardiac catheterization 10/31/2023 which was complicated by bleed from wrist.  Following cardiac cath in November, he reports decreased mobility, due to the fear of excessive bleeding from right groin.    On 11/12/2023, he noted increased swelling of his right lower extremity, presented to [facility] emergency room.  Ultrasound of lower extremity showed positive for deep venous thrombosis in right common femoral and proximal superficial femoral veins.  CT angio of the chest has been negative for pulmonary embolism.    Mr. Ahuja presents today due to completion of anticoagulation for greater than 6 months, he has reservations about continuing anticoagulation therapy indefinitely.  He has history of diverticulosis, with intermittent flares and bleeding.  Most recently over the past weekend, where he has held Eliquis.  He is also a practicing Restorationism, does not accept blood products.    No previous history of venous thromboemboli.  Does not smoke, drink, use drugs.  Currently retired, is very active, previously worked as an owner of a cleaning company.  He denies any recent weight loss, fevers, chills, night sweats, bony pain.  Last colonoscopy  approximately 5 years ago, was reportedly normal by Dr. Aggarwal.    Family history significant for mother and father with cardiac history, siblings with cardiac disease.      Interval History 09/03/2024   Mr. Ahuja presents for follow up. Denies any further hematochezia. U/S of the RLE is without further evidence of DVT. Thrombophilia work up was negative for inherited etiology. These were discussed with him today. He feels comfortable remaining off of Eliquis, still on plavix which he plans on discussing with his cardiologist regarding discontinuation.         Objective     Objective     Allergy:   No Known Allergies     Current Medications:   Current Outpatient Medications   Medication Sig Dispense Refill    cholecalciferol (VITAMIN D3) 400 units tablet Take 1 tablet by mouth Daily.      clopidogrel (PLAVIX) 75 MG tablet Take 1 tablet by mouth Every Evening. 90 tablet 1    GARLIC OIL PO Take  by mouth Daily.      metoprolol succinate XL (TOPROL-XL) 25 MG 24 hr tablet Take 0.5 tablets by mouth Daily. 45 tablet 3    Omega-3 Fatty Acids (FISH OIL) 1000 MG capsule capsule Take 1,200 mg by mouth Daily With Breakfast.      rosuvastatin (CRESTOR) 10 MG tablet Take 1 tablet by mouth Daily. 90 tablet 3    vitamin E 400 UNIT capsule Take 1 capsule by mouth Daily.      apixaban (ELIQUIS) 5 MG tablet tablet Take 1 tablet by mouth 2 (Two) Times a Day. (Patient not taking: Reported on 9/3/2024) 180 tablet 0    nitroglycerin (NITROSTAT) 0.4 MG SL tablet 1 under the tongue as needed for angina, may repeat q5mins for up three doses (Patient not taking: Reported on 9/3/2024) 100 tablet 11     No current facility-administered medications for this visit.       Past Medical History:  Past Medical History:   Diagnosis Date    Abnormal ECG     Arrhythmia     Cataract 04/03/23    Colon polyps     Coronary artery disease     Deep vein thrombosis     Diverticulosis ?    Glaucoma 11/2/22    HL (hearing loss)     Hyperlipidemia     Skin  "cancer     Vitamin D deficiency        Past Surgical History:  Past Surgical History:   Procedure Laterality Date    CARDIAC CATHETERIZATION N/A 10/31/2023    Procedure: Left Heart Cath;  Surgeon: Ankush West MD;  Location:  [facility];  Service: Cardiovascular;  Laterality: N/A;    CARDIAC CATHETERIZATION N/A 11/09/2023    Procedure: Percutaneous Coronary Intervention;  Surgeon: Ankush West MD;  Location:  [facility];  Service: Cardiovascular;  Laterality: N/A;    CARDIAC CATHETERIZATION N/A 11/09/2023    Procedure: Optical Coherence Tomography;  Surgeon: Ankush West MD;  Location:  [facility];  Service: Cardiovascular;  Laterality: N/A;    COLONOSCOPY  06/14/2019    Dr. Martines     CORONARY STENT PLACEMENT      EYE SURGERY      SKIN CANCER EXCISION      Mult.        Social History:  Social History     Socioeconomic History    Marital status:    Tobacco Use    Smoking status: Never     Passive exposure: Past    Smokeless tobacco: Never   Vaping Use    Vaping status: Never Used   Substance and Sexual Activity    Alcohol use: No    Drug use: No    Sexual activity: Not Currently     Partners: Female         Family History:  Family History   Problem Relation Age of Onset    Heart disease Mother     Heart failure Mother     Liver cancer Father     Heart attack Father     Cancer Father        Review of Systems:  Review of Systems   All other systems reviewed and are negative.      Vital Signs:   /80   Pulse 60   Temp 97.1 °F (36.2 °C) (Temporal)   Resp 16   Ht 175.3 cm (69\")   Wt 71.2 kg (157 lb)   SpO2 98%   BMI 23.18 kg/m²      Physical Exam:  Physical Exam  Vitals reviewed.   Constitutional:       General: He is not in acute distress.     Appearance: Normal appearance. He is not ill-appearing.   HENT:      Head: Normocephalic and atraumatic.      Mouth/Throat:      Mouth: Mucous membranes are moist.      Pharynx: Oropharynx is " "clear.   Eyes:      Conjunctiva/sclera: Conjunctivae normal.      Pupils: Pupils are equal, round, and reactive to light.   Cardiovascular:      Rate and Rhythm: Normal rate and regular rhythm.      Heart sounds: No murmur heard.  Pulmonary:      Effort: Pulmonary effort is normal. No respiratory distress.      Breath sounds: Normal breath sounds. No wheezing.   Abdominal:      General: Abdomen is flat. Bowel sounds are normal. There is no distension.      Palpations: Abdomen is soft. There is no mass.      Tenderness: There is no abdominal tenderness. There is no guarding.   Musculoskeletal:         General: No swelling. Normal range of motion.      Cervical back: Normal range of motion.   Lymphadenopathy:      Cervical: No cervical adenopathy.   Skin:     General: Skin is warm and dry.   Neurological:      General: No focal deficit present.      Mental Status: He is alert and oriented to person, place, and time. Mental status is at baseline.   Psychiatric:         Mood and Affect: Mood normal.         PHQ-9 Score  PHQ-9 Total Score:       Pain Score  Vitals:    09/03/24 1103   BP: 137/80   Pulse: 60   Resp: 16   Temp: 97.1 °F (36.2 °C)   TempSrc: Temporal   SpO2: 98%   Weight: 71.2 kg (157 lb)   Height: 175.3 cm (69\")   PainSc: 0-No pain         ECOG score: 0               Lab Review  Lab Results   Component Value Date    WBC 6.12 07/25/2024    HGB 14.9 07/25/2024    HCT 45.5 07/25/2024    MCV 91.4 07/25/2024    RDW 12.5 07/25/2024     07/25/2024    NEUTRORELPCT 60.3 07/25/2024    LYMPHORELPCT 28.8 07/25/2024    MONORELPCT 8.3 07/25/2024    EOSRELPCT 2.0 07/25/2024    BASORELPCT 0.3 07/25/2024    NEUTROABS 3.69 07/25/2024    LYMPHSABS 1.76 07/25/2024     Lab Results   Component Value Date     01/03/2024    K 4.0 01/03/2024    CO2 26.0 01/03/2024     01/03/2024    BUN 13 01/03/2024    CREATININE 1.21 01/03/2024    EGFRIFNONA 67 03/03/2020    GLUCOSE 120 (H) 01/03/2024    CALCIUM 9.0 01/03/2024    " ALKPHOS 71 01/03/2024    AST 27 01/03/2024    ALT 35 01/03/2024    BILITOT 0.4 01/03/2024    ALBUMIN 4.5 01/03/2024    PROTEINTOT 6.6 01/03/2024    MG 2.3 03/03/2020     Thrombophilia work up 7/25/2024:                                                              Radiology Results    US Venous Doppler Lower Extremity Right (duplex) (08/30/2024 09:11)     FINDINGS: Normal phasic flow was noted in the visualized deep venous  system. No intraluminal increased echogenicity is noted to suggest  thrombus. There is normal compression and augmentation of the venous  structures.  No abnormal venous collaterals are seen.     IMPRESSION:  No evidence of deep venous thrombosis.    CT Angiogram Chest Pulmonary Embolism (11/13/2023 04:48)   FINDINGS:  Contrast bolus timing is satisfactory to evaluate for pulmonary  embolism.  No pulmonary arterial filling defect is seen.  Pulmonary arteries: normal.  Negative for pulmonary embolism.  Heart and pericardium: normal.  Aorta: normal.  Lungs: A calcified granuloma is noted in the periphery of the right  lower lobe.  Lungs are otherwise clear.  Airways: normal.  Pleura: normal.  Lymph nodes: normal.  Musculoskeletal: no acute abnormality.  Esophagus: normal.  Upper abdomen: Scattered colonic diverticula.  Calcified granuloma in  the spleen.  Other: n/a     IMPRESSION:   NEGATIVE FOR PULMONARY EMBOLISM.  NO ACUTE ABNORMALITY IN THE CHEST.    US Venous Doppler Lower Extremity Right (duplex) (11/13/2023 01:19)   FINDINGS:   The right common femoral vein is expanded and noncompressible,  and has no flow with duplex Doppler.  Similar findings are seen in the  proximal superficial femoral vein.  Popliteal vein is patent,  compressible, and have normal augmented waveforms.  The peroneal and  posterior tibial veins are overall patent.     IMPRESSION:  POSITIVE FOR DEEP VENOUS THROMBOSIS IN THE RIGHT COMMON FEMORAL AND  PROXIMAL SUPERFICIAL FEMORAL VEINS.    Assessment / Plan         Assessment  and Plan   Jose Luis Ahuja is a 74 y.o. presents for     Venous thromboembolism RLE- provoked   -Patient underwent outpatient cardiac catheterization from right groin 11/9/2023, presented with right common femoral and proximal superficial femoral vein DVT 11/13/23.  CT angio of the chest without pulmonary emboli. Completed Eliquis 5 mg BID since 11/2023-7/2024  -No previous history of venous thromboembolism.  No family history of venous thromboembolism  -He is a Christianity does not accept blood products;   -Denies recent unintentional weight loss, fever, chills, night sweats, lymphadenopathy.  Normally very active.  Last colonoscopy about 5 years ago with Dr. Pereyra, reportedly normal.  -He does have a history of diverticulosis, with intermittent flares.  Worried about continuation of Eliquis with Plavix, which was recommended due to unprovoked venous thromboemboli. However, if he does have excessive bleeds, he would be unable to receive blood products.   -We discussed all of the above today, given this was his first DVT, after first procedure on the same leg, but my inclination is, this is likely provoked.   -Thrombophilia work up yielded negative results for Factor V leiden mutation, lupus anticoagulant, prothrombin gene mutation, protein S and C deficiencies, and antiphospholipid antibody syndrome. He is noted to mildly elevated anticardiolipin IgM (14), however other lab work is negative.   -Repeat U/S of RLE is without DVT 8/2024  -Based on the above, we reviewed he has completed AC for the duration of >6  months; if he were to have a recurrence, recommendation would be for lifelong AC. He is agreeable            Discussed possible differential diagnoses, testing, treatment, recommended non-pharmacological interventions, risks, warning signs to monitor for that would indicate need for follow-up in clinic or ER. If no improvement with these regimens or you have new or worsening symptoms follow-up. Patient  verbalizes understanding and agreement with plan of care. Denies further needs or concerns.     Patient was given instructions and counseling regarding her condition and for health maintenance advised.       All questions were answered to his satisfaction.    BMI is within normal parameters. No other follow-up for BMI required.         Meds ordered during this visit  No orders of the defined types were placed in this encounter.      Visit Diagnoses    ICD-10-CM ICD-9-CM   1. Chronic deep vein thrombosis (DVT) of proximal vein of right lower extremity  I82.5Y1 453.51         Follow Up   As needed         This document has been electronically signed by Judy Rossi MD   September 3, 2024 11:58 EDT    Dictated Utilizing Dragon Dictation: Part of this note may be an electronic transcription/translation of spoken language to printed text using the Dragon Dictation System.

## 2024-10-22 ENCOUNTER — TELEPHONE (OUTPATIENT)
Dept: CARDIOLOGY | Facility: CLINIC | Age: 74
End: 2024-10-22

## 2024-10-22 NOTE — TELEPHONE ENCOUNTER
"Caller: Jose Luis Ahuja \"Dale\"    Relationship: Self    Best call back number: [phone]     Which medication are you concerned about: baby aspirin    Who prescribed you this medication: DR. BOBBY    What are your concerns: PT HAS BEEN OFF OF ELIQUIS FOR 3 MONTHS, WAS ALSO TAKING PLAVIX/ baby aspirin. PT IS WONDERING IF HE NEEDS TO GET BACK ON THE baby aspirin.- PLEASE ADVISE.     PT IS HAVING CHEST PAIN WHENEVER ACTIVE- NOT ACTIVE AS OF PHONE CALL           "

## 2024-10-23 NOTE — TELEPHONE ENCOUNTER
HUB TO RELAY         If he is having chest pains anytime he walks he should go to the ER.  Yes I would recommend taking aspirin         Note

## 2024-10-23 NOTE — TELEPHONE ENCOUNTER
If he is having chest pains anytime he walks he should go to the ER.  Yes I would recommend taking aspirin

## 2024-11-01 ENCOUNTER — OFFICE VISIT (OUTPATIENT)
Dept: CARDIOLOGY | Facility: CLINIC | Age: 74
End: 2024-11-01
Payer: MEDICARE

## 2024-11-01 VITALS
HEART RATE: 64 BPM | OXYGEN SATURATION: 99 % | HEIGHT: 69 IN | DIASTOLIC BLOOD PRESSURE: 73 MMHG | BODY MASS INDEX: 23.61 KG/M2 | RESPIRATION RATE: 16 BRPM | WEIGHT: 159.4 LBS | SYSTOLIC BLOOD PRESSURE: 138 MMHG

## 2024-11-01 DIAGNOSIS — E78.5 HYPERLIPIDEMIA LDL GOAL <70: ICD-10-CM

## 2024-11-01 DIAGNOSIS — I25.118 CORONARY ARTERY DISEASE OF NATIVE ARTERY OF NATIVE HEART WITH STABLE ANGINA PECTORIS: Primary | ICD-10-CM

## 2024-11-01 PROCEDURE — 93000 ELECTROCARDIOGRAM COMPLETE: CPT | Performed by: PHYSICIAN ASSISTANT

## 2024-11-01 PROCEDURE — 99214 OFFICE O/P EST MOD 30 MIN: CPT | Performed by: PHYSICIAN ASSISTANT

## 2024-11-01 PROCEDURE — 1159F MED LIST DOCD IN RCRD: CPT | Performed by: PHYSICIAN ASSISTANT

## 2024-11-01 PROCEDURE — 1160F RVW MEDS BY RX/DR IN RCRD: CPT | Performed by: PHYSICIAN ASSISTANT

## 2024-11-01 RX ORDER — RANOLAZINE 500 MG/1
500 TABLET, EXTENDED RELEASE ORAL 2 TIMES DAILY
Qty: 60 TABLET | Refills: 2 | Status: SHIPPED | OUTPATIENT
Start: 2024-11-01

## 2024-11-01 NOTE — PROGRESS NOTES
Sekou Mcmanus DO  Jose Luis Ahuja  1950 11/01/2024    Patient Active Problem List   Diagnosis    Hyperlipidemia LDL goal <70    Elevated blood pressure reading    Vitamin D deficiency disease    Coronary artery disease involving native coronary artery of native heart       Dear Sekou Mcmanus DO:    Subjective     History of Present Illness:    Chief Complaint   Patient presents with    Follow-up     8 mos    Chest Pain     Little to no change, onset with mod exertion    Med Management     verbal   Coronary artery disease  Exercise MPS, 08/18/2023: EF 63%. Normal with no evidence of ischemia.  Calcium scores, 10/26/2023: The total calcium score is 214 indicating moderate (-300) calcified plaque in the coronary tree.   LHC, 10/31/2023: EF 60%. 60% distal left main extending into the proximal LAD.   OCT, 11/09/2023: 70% distal left main stenosis with 60-70 diffuse plaque throughout the proximal LAD.  OCT guided stenting, 4.0 x 18 Xience and 3.25 x 23 Xience IVANA in overlapping fashion.  Postdilated with 4.5 NC and 3.5 NC trek balloons respectively.  No residual stenosis. No flow-limiting LCx disease  DVT  Venous duplex, 11/12/2023: Positive for DVT in the right common femoral and proximal superficial femoral veins.  Hypertension  Dyslipidemia    Jose Luis Ahuja is a pleasant 74 y.o. male with a past medical history significant for  coronary artery disease recently discovered on 8/29/2023 with CT coronary angiogram revealing left main and proximal LAD stenosis with subsequent left heart cath showing 60% stenosis that was stented. He did unfortunately developed a right lower DVT and is now on Eliquis. Otherwise he is nondiabetic nonhypertensive, and no history of nicotine abuse. He comes in today for cardiology follow-up.     Dale reports that he has been stable since he was last seen.  However, he still reports symptoms consistent with stable angina.  He reports he walks 2 miles almost every  "single day he walks a loop at his local park there is a spot at this loop where he has a slight incline during this incline he will develop a little pressure in his chest.  However after a few laps he reports the pressure is less.  He otherwise denies any chest pains throughout the day.  He denies any palpitations, shortness of breath, or syncope.    No Known Allergies:      Current Outpatient Medications:     cholecalciferol (VITAMIN D3) 400 units tablet, Take 1 tablet by mouth Daily., Disp: , Rfl:     clopidogrel (PLAVIX) 75 MG tablet, Take 1 tablet by mouth Every Evening., Disp: 90 tablet, Rfl: 1    GARLIC OIL PO, Take  by mouth Daily., Disp: , Rfl:     metoprolol succinate XL (TOPROL-XL) 25 MG 24 hr tablet, Take 0.5 tablets by mouth Daily., Disp: 45 tablet, Rfl: 3    nitroglycerin (NITROSTAT) 0.4 MG SL tablet, 1 under the tongue as needed for angina, may repeat q5mins for up three doses, Disp: 100 tablet, Rfl: 11    Omega-3 Fatty Acids (FISH OIL) 1000 MG capsule capsule, Take 1,200 mg by mouth Daily With Breakfast., Disp: , Rfl:     rosuvastatin (CRESTOR) 10 MG tablet, Take 1 tablet by mouth Daily., Disp: 90 tablet, Rfl: 3    vitamin E 400 UNIT capsule, Take 1 capsule by mouth Daily., Disp: , Rfl:     ranolazine (Ranexa) 500 MG 12 hr tablet, Take 1 tablet by mouth 2 (Two) Times a Day., Disp: 60 tablet, Rfl: 2    The following portions of the patient's history were reviewed and updated as appropriate: allergies, current medications, past family history, past medical history, past social history, past surgical history and problem list.    Social History     Tobacco Use    Smoking status: Never     Passive exposure: Past    Smokeless tobacco: Never   Vaping Use    Vaping status: Never Used   Substance Use Topics    Alcohol use: No    Drug use: No         Objective   Vitals:    11/01/24 0849   BP: 138/73   Pulse: 64   Resp: 16   SpO2: 99%   Weight: 72.3 kg (159 lb 6.4 oz)   Height: 175.3 cm (69\")     Body mass index " "is 23.54 kg/m².    ROS    Constitutional:       General: Not in acute distress.     Appearance: Healthy appearance. Well-developed and not in distress. Not diaphoretic.   Eyes:      Conjunctiva/sclera: Conjunctivae normal.      Pupils: Pupils are equal, round, and reactive to light.   HENT:      Head: Normocephalic and atraumatic.   Neck:      Vascular: No carotid bruit or JVD.   Pulmonary:      Effort: Pulmonary effort is normal. No respiratory distress.      Breath sounds: Normal breath sounds.   Cardiovascular:      Normal rate. Regular rhythm.   Edema:     Peripheral edema absent.   Skin:     General: Skin is cool.   Neurological:      Mental Status: Alert, oriented to person, place, and time and oriented to person, place and time.         Lab Results   Component Value Date     01/03/2024    K 4.0 01/03/2024     01/03/2024    CO2 26.0 01/03/2024    BUN 13 01/03/2024    CREATININE 1.21 01/03/2024    GLUCOSE 120 (H) 01/03/2024    CALCIUM 9.0 01/03/2024    AST 27 01/03/2024    ALT 35 01/03/2024    ALKPHOS 71 01/03/2024     Lab Results   Component Value Date    CKTOTAL 87 01/03/2024     Lab Results   Component Value Date    WBC 6.12 07/25/2024    HGB 14.9 07/25/2024    HCT 45.5 07/25/2024     07/25/2024     Lab Results   Component Value Date    INR 0.85 (L) 11/13/2023     Lab Results   Component Value Date    MG 2.3 03/03/2020     Lab Results   Component Value Date    TSH 1.610 03/03/2020    PSA 2.370 03/03/2020    TRIG 73 02/27/2024    HDL 40 02/27/2024    LDL 77 02/27/2024      No results found for: \"BNP\"    During this visit the following were done:  Labs Reviewed []    Labs Ordered []    Radiology Reports Reviewed []    Radiology Ordered []    PCP Records Reviewed []    Referring Provider Records Reviewed []    ER Records Reviewed []    Hospital Records Reviewed []    History Obtained From Family []    Radiology Images Reviewed []    Other Reviewed []    Records Requested []         ECG 12 " Lead    Date/Time: 11/1/2024 8:49 AM  Performed by: Jaron Weston PA-C    Authorized by: Jaron Weston PA-C  Comparison: compared with previous ECG   Similar to previous ECG  Rhythm: sinus rhythm  Conduction: conduction normal    Clinical impression: normal ECG          Assessment & Plan    Diagnosis Plan   1. Coronary artery disease of native artery of native heart with stable angina pectoris  Lipid Panel      2. Hyperlipidemia LDL goal <70  Lipid Panel               Recommendations:  CAD with stable angina  Discussed options with Dale cazares.  Discussed about returning to Dr. West for evaluation for possible left heart cath.  However I also discussed trying some further antianginals.  In the past he was unable to tolerate Imdur so I will try Ranexa 500 mg twice daily  Dyslipidemia  He reports he was having significant muscle cramps with 20 mg of Crestor so he reduce dose to 10 mg will repeat lipid panel to monitor for improvement    Return in about 6 months (around 5/1/2025).    As always, I appreciate very much the opportunity to participate in the cardiovascular care of your patients.      With Best Regards,    Jaron Weston PA-C

## 2024-11-07 ENCOUNTER — LAB (OUTPATIENT)
Dept: FAMILY MEDICINE CLINIC | Facility: CLINIC | Age: 74
End: 2024-11-07
Payer: MEDICARE

## 2024-11-07 DIAGNOSIS — I25.118 CORONARY ARTERY DISEASE OF NATIVE ARTERY OF NATIVE HEART WITH STABLE ANGINA PECTORIS: ICD-10-CM

## 2024-11-07 DIAGNOSIS — E78.5 HYPERLIPIDEMIA LDL GOAL <70: ICD-10-CM

## 2024-11-07 LAB
CHOLEST SERPL-MCNC: 145 MG/DL (ref 0–200)
HDLC SERPL-MCNC: 43 MG/DL (ref 40–60)
LDLC SERPL CALC-MCNC: 87 MG/DL (ref 0–100)
LDLC/HDLC SERPL: 2.03 {RATIO}
TRIGL SERPL-MCNC: 74 MG/DL (ref 0–150)
VLDLC SERPL-MCNC: 15 MG/DL (ref 5–40)

## 2024-11-07 PROCEDURE — 36415 COLL VENOUS BLD VENIPUNCTURE: CPT

## 2024-11-07 PROCEDURE — 80061 LIPID PANEL: CPT | Performed by: PHYSICIAN ASSISTANT

## 2024-12-18 DIAGNOSIS — I25.118 CORONARY ARTERY DISEASE OF NATIVE ARTERY OF NATIVE HEART WITH STABLE ANGINA PECTORIS: ICD-10-CM

## 2024-12-18 RX ORDER — METOPROLOL SUCCINATE 25 MG/1
12.5 TABLET, EXTENDED RELEASE ORAL DAILY
Qty: 45 TABLET | Refills: 3 | Status: SHIPPED | OUTPATIENT
Start: 2024-12-18

## 2025-02-14 ENCOUNTER — OFFICE VISIT (OUTPATIENT)
Dept: FAMILY MEDICINE CLINIC | Facility: CLINIC | Age: 75
End: 2025-02-14
Payer: MEDICARE

## 2025-02-14 VITALS
HEART RATE: 87 BPM | SYSTOLIC BLOOD PRESSURE: 140 MMHG | TEMPERATURE: 96.9 F | OXYGEN SATURATION: 100 % | HEIGHT: 69 IN | BODY MASS INDEX: 24.02 KG/M2 | DIASTOLIC BLOOD PRESSURE: 74 MMHG | WEIGHT: 162.2 LBS

## 2025-02-14 DIAGNOSIS — M25.471 RIGHT ANKLE SWELLING: Primary | ICD-10-CM

## 2025-02-14 PROCEDURE — 99213 OFFICE O/P EST LOW 20 MIN: CPT | Performed by: FAMILY MEDICINE

## 2025-02-14 PROCEDURE — 1126F AMNT PAIN NOTED NONE PRSNT: CPT | Performed by: FAMILY MEDICINE

## 2025-02-14 PROCEDURE — G2211 COMPLEX E/M VISIT ADD ON: HCPCS | Performed by: FAMILY MEDICINE

## 2025-02-14 NOTE — PROGRESS NOTES
Subjective   Jose Luis Ahuja is a 74 y.o. male.   Pt presents today with CC of Joint Swelling (Right ankle)      History of Present Illness   History of Present Illness  Patient is a 74-year-old male with history of DVT.  Not anticoagulated.  He takes Plavix.  No longer on Ranexa as it was not tolerable, per report.  He developed right ankle swelling yesterday, no known injury though it did hurt about 3 out of 10 yesterday.  Because of his history of DVT he wanted evaluated.  The swelling is gone down, and the pain is back to 0.  However, there is some faint discoloration that he would like evaluated.     The following portions of the patient's history were reviewed and updated as appropriate: allergies, current medications, past family history, past medical history, past social history, past surgical history, and problem list.    Review of Systems   Constitutional:  Negative for chills, fever and unexpected weight loss.   HENT:  Negative for congestion and sore throat.    Eyes:  Negative for blurred vision and visual disturbance.   Respiratory:  Negative for cough and wheezing.    Cardiovascular:  Negative for chest pain and palpitations.   Gastrointestinal:  Negative for abdominal pain and diarrhea.   Endocrine: Negative for cold intolerance and heat intolerance.   Genitourinary:  Negative for dysuria.   Musculoskeletal:  Negative for arthralgias and neck stiffness.   Skin:  Positive for bruise.   Neurological:  Negative for dizziness, seizures and syncope.   Psychiatric/Behavioral:  Negative for self-injury, suicidal ideas and depressed mood.      Vitals:    02/14/25 1303   BP: 140/74   Pulse:    Temp:    SpO2:         Objective   Physical Exam  Vitals and nursing note reviewed.   Musculoskeletal:      Comments: Right ankle has trace swelling around the lateral malleolus.  Faint discoloration, very light purple around the lateral malleolus as well as the medial malleolus.  No yellowing of the skin, no signs of  trauma.  No swelling anywhere else, particularly in the distal foot.  Negative Homans' sign,         Assessment & Plan   Diagnoses and all orders for this visit:    1. Right ankle swelling (Primary)    What brings him in is that he had a blood clot in the past.  He does not have a clotting disorder.  It is my medical opinion that the clinical presentation is not indicated of a DVT.  No distal foot swelling, no proximal leg swelling.  Only some discoloration and faint, hardly noticeable, swelling around the lateral malleolus.  He does take Plavix, though he denies any known injury.  For now, reassurance given as his swelling has gone down since yesterday as well as this pain resolving.               This document has been electronically signed by Sekou Mcmanus DO  February 14, 2025 13:15 EST    Dictated Utilizing Dragon Dictation: Part of this note may be an electronic transcription/translation of spoken language to printed text using the Dragon Dictation System.

## 2025-03-03 ENCOUNTER — OFFICE VISIT (OUTPATIENT)
Dept: FAMILY MEDICINE CLINIC | Facility: CLINIC | Age: 75
End: 2025-03-03
Payer: MEDICARE

## 2025-03-03 VITALS
BODY MASS INDEX: 23.85 KG/M2 | HEIGHT: 69 IN | OXYGEN SATURATION: 98 % | WEIGHT: 161 LBS | SYSTOLIC BLOOD PRESSURE: 148 MMHG | DIASTOLIC BLOOD PRESSURE: 80 MMHG | TEMPERATURE: 97.8 F | HEART RATE: 92 BPM

## 2025-03-03 DIAGNOSIS — M25.571 ACUTE RIGHT ANKLE PAIN: ICD-10-CM

## 2025-03-03 DIAGNOSIS — M67.40 GANGLION CYST: Primary | ICD-10-CM

## 2025-03-03 RX ORDER — PREDNISONE 20 MG/1
20 TABLET ORAL DAILY
Qty: 4 TABLET | Refills: 0 | Status: SHIPPED | OUTPATIENT
Start: 2025-03-03

## 2025-03-03 NOTE — PROGRESS NOTES
Subjective   Jose Luis Ahuja is a 74 y.o. male.   Pt presents today with CC of Ankle Pain (Right ankle)      History of Present Illness   History of Present Illness  Right ankle pain, generalized stiffness when the pain is present.  He reports from his last appointment, he went several days with no pain at all, and then he woke up with pain again and a lump anterior to his lateral malleolus.  Of note, the pain is mostly anterior to the lateral malleolus, though the entire ankle is a little tender with movement.  He is having difficulty with weightbearing today.       The following portions of the patient's history were reviewed and updated as appropriate: allergies, current medications, past family history, past medical history, past social history, past surgical history, and problem list.    Review of Systems   Constitutional:  Negative for chills, fever and unexpected weight loss.   HENT:  Negative for congestion and sore throat.    Eyes:  Negative for blurred vision and visual disturbance.   Respiratory:  Negative for cough and wheezing.    Cardiovascular:  Negative for chest pain and palpitations.   Gastrointestinal:  Negative for abdominal pain and diarrhea.   Endocrine: Negative for cold intolerance and heat intolerance.   Genitourinary:  Negative for dysuria.   Musculoskeletal:  Positive for arthralgias. Negative for neck stiffness.   Neurological:  Negative for dizziness, seizures and syncope.   Psychiatric/Behavioral:  Negative for self-injury, suicidal ideas and depressed mood.      Vitals:    03/03/25 1016   BP: 148/80   Pulse:    Temp:    SpO2:         Objective   Physical Exam  Vitals and nursing note reviewed.   Constitutional:       Appearance: He is well-developed.   HENT:      Head: Normocephalic and atraumatic.      Right Ear: External ear normal.      Left Ear: External ear normal.      Nose: Nose normal.   Eyes:      Conjunctiva/sclera: Conjunctivae normal.      Pupils: Pupils are equal, round,  and reactive to light.   Cardiovascular:      Rate and Rhythm: Normal rate and regular rhythm.      Heart sounds: Normal heart sounds.   Pulmonary:      Effort: Pulmonary effort is normal.      Breath sounds: Normal breath sounds.   Abdominal:      General: Bowel sounds are normal.      Palpations: Abdomen is soft.   Musculoskeletal:      Cervical back: Normal range of motion and neck supple.      Comments: Anterior to the lateral malleolus is a 1 cm diameter rubbery nodule.  The nodule itself is not tender though the joint adjacent to it is the cause of his pain.  There is faint swelling around the area, no warmth.  Touching the skin is not tender.  Negative Homans' sign, no significant foot or lower leg swelling, the veins are not engorged.   Skin:     General: Skin is warm and dry.   Neurological:      Mental Status: He is alert and oriented to person, place, and time.   Psychiatric:         Behavior: Behavior normal.           Assessment & Plan   Diagnoses and all orders for this visit:    1. Ganglion cyst (Primary)  -     Ambulatory Referral to Orthopedic Surgery    2. Acute right ankle pain  -     Ambulatory Referral to Orthopedic Surgery  Discussed bracing.  I am not confident it is going to make any difference for him so I will hold off.  Other orders  -     Diclofenac Sodium (VOLTAREN) 1 % gel gel; Apply 4 g topically to the appropriate area as directed 4 (Four) Times a Day.  Dispense: 100 g; Refill: 0  -     predniSONE (DELTASONE) 20 MG tablet; Take 1 tablet by mouth Daily.  Dispense: 4 tablet; Refill: 0  I recommend ice 15 minutes on, 15 minutes off followed by diclofenac gel.  Inflammatory condition seems to be the cause so I will trial prednisone.  Will refer him to orthopedic surgery for their opinion.  I suspect this is a ganglion cyst, and that it is only a ganglion cyst.  Because of his history of blood clots, that has to remain on the differential, though I do not believe this has any signs or  symptoms of a blood clot.                BMI is within normal parameters. No other follow-up for BMI required.          This document has been electronically signed by Cyndee Langley  March 3, 2025 10:17 EST    Dictated Utilizing Dragon Dictation: Part of this note may be an electronic transcription/translation of spoken language to printed text using the Dragon Dictation System.

## 2025-03-20 DIAGNOSIS — M25.571 RIGHT ANKLE PAIN, UNSPECIFIED CHRONICITY: Primary | ICD-10-CM

## 2025-07-14 ENCOUNTER — OFFICE VISIT (OUTPATIENT)
Dept: FAMILY MEDICINE CLINIC | Facility: CLINIC | Age: 75
End: 2025-07-14
Payer: MEDICARE

## 2025-07-14 VITALS
BODY MASS INDEX: 23.58 KG/M2 | DIASTOLIC BLOOD PRESSURE: 80 MMHG | SYSTOLIC BLOOD PRESSURE: 138 MMHG | WEIGHT: 159.2 LBS | OXYGEN SATURATION: 99 % | HEIGHT: 69 IN | TEMPERATURE: 97.7 F | HEART RATE: 71 BPM

## 2025-07-14 DIAGNOSIS — E78.5 HYPERLIPIDEMIA LDL GOAL <70: ICD-10-CM

## 2025-07-14 DIAGNOSIS — R73.03 PREDIABETES: ICD-10-CM

## 2025-07-14 DIAGNOSIS — E55.9 VITAMIN D DEFICIENCY: ICD-10-CM

## 2025-07-14 DIAGNOSIS — Z00.00 MEDICARE ANNUAL WELLNESS VISIT, SUBSEQUENT: Primary | ICD-10-CM

## 2025-07-14 DIAGNOSIS — I25.118 CORONARY ARTERY DISEASE OF NATIVE ARTERY OF NATIVE HEART WITH STABLE ANGINA PECTORIS: ICD-10-CM

## 2025-07-14 RX ORDER — METOPROLOL SUCCINATE 25 MG/1
12.5 TABLET, EXTENDED RELEASE ORAL DAILY
Qty: 45 TABLET | Refills: 1 | Status: SHIPPED | OUTPATIENT
Start: 2025-07-14

## 2025-07-14 RX ORDER — ROSUVASTATIN CALCIUM 10 MG/1
10 TABLET, COATED ORAL DAILY
Qty: 90 TABLET | Refills: 1 | Status: SHIPPED | OUTPATIENT
Start: 2025-07-14

## 2025-07-15 ENCOUNTER — LAB (OUTPATIENT)
Dept: FAMILY MEDICINE CLINIC | Facility: CLINIC | Age: 75
End: 2025-07-15
Payer: MEDICARE

## 2025-07-15 DIAGNOSIS — R73.03 PREDIABETES: ICD-10-CM

## 2025-07-15 DIAGNOSIS — E55.9 VITAMIN D DEFICIENCY: ICD-10-CM

## 2025-07-15 DIAGNOSIS — I25.118 CORONARY ARTERY DISEASE OF NATIVE ARTERY OF NATIVE HEART WITH STABLE ANGINA PECTORIS: ICD-10-CM

## 2025-07-15 DIAGNOSIS — E78.5 HYPERLIPIDEMIA LDL GOAL <70: ICD-10-CM

## 2025-07-15 LAB
25(OH)D3 SERPL-MCNC: 36 NG/ML (ref 30–100)
ALBUMIN SERPL-MCNC: 4.1 G/DL (ref 3.5–5.2)
ALBUMIN/GLOB SERPL: 1.5 G/DL
ALP SERPL-CCNC: 55 U/L (ref 39–117)
ALT SERPL W P-5'-P-CCNC: 21 U/L (ref 1–41)
ANION GAP SERPL CALCULATED.3IONS-SCNC: 10.1 MMOL/L (ref 5–15)
AST SERPL-CCNC: 22 U/L (ref 1–40)
BILIRUB SERPL-MCNC: 0.6 MG/DL (ref 0–1.2)
BUN SERPL-MCNC: 11 MG/DL (ref 8–23)
BUN/CREAT SERPL: 10.4 (ref 7–25)
CALCIUM SPEC-SCNC: 9.1 MG/DL (ref 8.6–10.5)
CHLORIDE SERPL-SCNC: 104 MMOL/L (ref 98–107)
CHOLEST SERPL-MCNC: 130 MG/DL (ref 0–200)
CO2 SERPL-SCNC: 23.9 MMOL/L (ref 22–29)
CREAT SERPL-MCNC: 1.06 MG/DL (ref 0.76–1.27)
DEPRECATED RDW RBC AUTO: 42.1 FL (ref 37–54)
EGFRCR SERPLBLD CKD-EPI 2021: 72.7 ML/MIN/1.73
ERYTHROCYTE [DISTWIDTH] IN BLOOD BY AUTOMATED COUNT: 12.7 % (ref 12.3–15.4)
GLOBULIN UR ELPH-MCNC: 2.8 GM/DL
GLUCOSE SERPL-MCNC: 88 MG/DL (ref 65–99)
HCT VFR BLD AUTO: 44.8 % (ref 37.5–51)
HDLC SERPL-MCNC: 50 MG/DL (ref 40–60)
HGB BLD-MCNC: 14.8 G/DL (ref 13–17.7)
LDLC SERPL CALC-MCNC: 66 MG/DL (ref 0–100)
LDLC/HDLC SERPL: 1.32 {RATIO}
MCH RBC QN AUTO: 30.5 PG (ref 26.6–33)
MCHC RBC AUTO-ENTMCNC: 33 G/DL (ref 31.5–35.7)
MCV RBC AUTO: 92.2 FL (ref 79–97)
PLATELET # BLD AUTO: 238 10*3/MM3 (ref 140–450)
PMV BLD AUTO: 11 FL (ref 6–12)
POTASSIUM SERPL-SCNC: 4.5 MMOL/L (ref 3.5–5.2)
PROT SERPL-MCNC: 6.9 G/DL (ref 6–8.5)
RBC # BLD AUTO: 4.86 10*6/MM3 (ref 4.14–5.8)
SODIUM SERPL-SCNC: 138 MMOL/L (ref 136–145)
TRIGL SERPL-MCNC: 71 MG/DL (ref 0–150)
VLDLC SERPL-MCNC: 14 MG/DL (ref 5–40)
WBC NRBC COR # BLD AUTO: 6.95 10*3/MM3 (ref 3.4–10.8)

## 2025-07-15 PROCEDURE — 80053 COMPREHEN METABOLIC PANEL: CPT | Performed by: FAMILY MEDICINE

## 2025-07-15 PROCEDURE — 85027 COMPLETE CBC AUTOMATED: CPT | Performed by: FAMILY MEDICINE

## 2025-07-15 PROCEDURE — 82306 VITAMIN D 25 HYDROXY: CPT | Performed by: FAMILY MEDICINE

## 2025-07-15 PROCEDURE — 80061 LIPID PANEL: CPT | Performed by: FAMILY MEDICINE

## 2025-07-15 PROCEDURE — 36415 COLL VENOUS BLD VENIPUNCTURE: CPT

## 2025-07-16 NOTE — PROGRESS NOTES
Subjective   The ABCs of the Annual Wellness Visit  Medicare Wellness Visit      Jose Luis Ahuja is a 75 y.o. patient who presents for a Medicare Wellness Visit.    The following portions of the patient's history were reviewed and   updated as appropriate: allergies, current medications, past family history, past medical history, past social history, past surgical history, and problem list.    Compared to one year ago, the patient's physical   health is the same.  Compared to one year ago, the patient's mental   health is the same.    Recent Hospitalizations:  He was not admitted to the hospital during the last year.     Current Medical Providers:  Patient Care Team:  Sekou Mcmanus DO as PCP - General (Family Medicine)  Regino Vazquez MD as Consulting Physician (Cardiology)  Ankush West MD as Consulting Physician (Cardiology)    Outpatient Medications Prior to Visit   Medication Sig Dispense Refill    cholecalciferol (VITAMIN D3) 400 units tablet Take 1 tablet by mouth Daily.      Diclofenac Sodium (VOLTAREN) 1 % gel gel Apply 4 g topically to the appropriate area as directed 4 (Four) Times a Day. 100 g 0    GARLIC OIL PO Take  by mouth Daily.      nitroglycerin (NITROSTAT) 0.4 MG SL tablet 1 under the tongue as needed for angina, may repeat q5mins for up three doses 100 tablet 11    Omega-3 Fatty Acids (FISH OIL) 1000 MG capsule capsule Take 1,200 mg by mouth Daily With Breakfast.      predniSONE (DELTASONE) 20 MG tablet Take 1 tablet by mouth Daily. 4 tablet 0    vitamin E 400 UNIT capsule Take 1 capsule by mouth Daily.      metoprolol succinate XL (TOPROL-XL) 25 MG 24 hr tablet Take 0.5 tablets by mouth Daily. 45 tablet 3    rosuvastatin (CRESTOR) 10 MG tablet Take 1 tablet by mouth Daily. 90 tablet 3    clopidogrel (PLAVIX) 75 MG tablet Take 1 tablet by mouth Every Evening. (Patient not taking: Reported on 7/14/2025) 90 tablet 1     No facility-administered medications prior to visit.     No opioid  "medication identified on active medication list. I have reviewed chart for other potential  high risk medication/s and harmful drug interactions in the elderly.      Aspirin is not on active medication list.  Aspirin use is not indicated based on review of current medical condition/s. Risk of harm outweighs potential benefits.  .    Patient Active Problem List   Diagnosis    Hyperlipidemia LDL goal <70    Elevated blood pressure reading    Vitamin D deficiency disease    Coronary artery disease involving native coronary artery of native heart     Advance Care Planning Advance Directive is on file.  ACP discussion was held with the patient during this visit. Patient has an advance directive in EMR which is still valid.             Objective   Vitals:    07/14/25 1359   BP: 138/80   Pulse: 71   Temp: 97.7 °F (36.5 °C)   TempSrc: Temporal   SpO2: 99%   Weight: 72.2 kg (159 lb 3.2 oz)   Height: 175.3 cm (69\")   PainSc: 0-No pain       Estimated body mass index is 23.51 kg/m² as calculated from the following:    Height as of this encounter: 175.3 cm (69\").    Weight as of this encounter: 72.2 kg (159 lb 3.2 oz).    BMI is within normal parameters. No other follow-up for BMI required.       Does the patient have evidence of cognitive impairment? No  Lab Results   Component Value Date    TRIG 71 07/15/2025    HDL 50 07/15/2025    LDL 66 07/15/2025    VLDL 14 07/15/2025                                                                                                Health  Risk Assessment    Smoking Status:  Social History     Tobacco Use   Smoking Status Never    Passive exposure: Past   Smokeless Tobacco Never     Alcohol Consumption:  Social History     Substance and Sexual Activity   Alcohol Use No       Fall Risk Screen  STEADI Fall Risk Assessment was completed, and patient is at MODERATE risk for falls. Assessment completed on:7/14/2025    Depression Screening   Little interest or pleasure in doing things? Not at all "   Feeling down, depressed, or hopeless? Not at all   PHQ-2 Total Score 0      Health Habits and Functional and Cognitive Screenin/14/2025     2:04 PM   Functional & Cognitive Status   Do you have difficulty preparing food and eating? No   Do you have difficulty bathing yourself, getting dressed or grooming yourself? No   Do you have difficulty using the toilet? No   Do you have difficulty moving around from place to place? No   Do you have trouble with steps or getting out of a bed or a chair? No   Current Diet Well Balanced Diet   Dental Exam Not up to date   Eye Exam Up to date   Exercise (times per week) 4 times per week   Current Exercises Include Walking   Do you need help using the phone?  No   Are you deaf or do you have serious difficulty hearing?  Yes   Do you need help to go to places out of walking distance? No   Do you need help shopping? No   Do you need help preparing meals?  No   Do you need help with housework?  No   Do you need help with laundry? No   Do you need help taking your medications? No   Do you need help managing money? No   Do you ever drive or ride in a car without wearing a seat belt? No   Have you felt unusual fatigue (could be tiredness), stress, anger or loneliness in the last month? No   Who do you live with? Spouse   If you need help, do you have trouble finding someone available to you? No   Have you been bothered in the last four weeks by sexual problems? No   Do you have difficulty concentrating, remembering or making decisions? No           Age-appropriate Screening Schedule:  Refer to the list below for future screening recommendations based on patient's age, sex and/or medical conditions. Orders for these recommended tests are listed in the plan section. The patient has been provided with a written plan.    Health Maintenance List  Health Maintenance   Topic Date Due    Pneumococcal Vaccine 50+ (1 of 2 - PCV) Never done    ZOSTER VACCINE (1 of 2) Never done     "HEPATITIS C SCREENING  Never done    RSV Vaccine - Adults (1 - 1-dose 75+ series) Never done    COVID-19 Vaccine (6 - 2024-25 season) 09/01/2024    ANNUAL WELLNESS VISIT  07/02/2025    INFLUENZA VACCINE  10/01/2025    TDAP/TD VACCINES (2 - Td or Tdap) 06/17/2026    LIPID PANEL  07/15/2026    COLORECTAL CANCER SCREENING  06/14/2029                                                                                                                                                CMS Preventative Services Quick Reference  Risk Factors Identified During Encounter  None Identified    The above risks/problems have been discussed with the patient.  Pertinent information has been shared with the patient in the After Visit Summary.  An After Visit Summary and PPPS were made available to the patient.    Follow Up:   Next Medicare Wellness visit to be scheduled in 1 year.         Additional E&M Note during same encounter follows:  Patient has additional, significant, and separately identifiable condition(s)/problem(s) that require work above and beyond the Medicare Wellness Visit     Chief Complaint  Medicare Wellness-subsequent    Subjective   HPI  Dale is also being seen today for additional medical problem/s.    Review of Systems   Constitutional:  Negative for fever.   Respiratory:  Negative for chest tightness, shortness of breath and wheezing.    Cardiovascular:  Negative for chest pain and palpitations.              Objective   Vital Signs:  /80   Pulse 71   Temp 97.7 °F (36.5 °C) (Temporal)   Ht 175.3 cm (69\")   Wt 72.2 kg (159 lb 3.2 oz)   SpO2 99%   BMI 23.51 kg/m²   Physical Exam  Vitals and nursing note reviewed.   Constitutional:       Appearance: He is well-developed.   HENT:      Head: Normocephalic and atraumatic.      Right Ear: External ear normal.      Left Ear: External ear normal.      Nose: Nose normal.   Eyes:      Conjunctiva/sclera: Conjunctivae normal.      Pupils: Pupils are equal, round, and " reactive to light.   Cardiovascular:      Rate and Rhythm: Normal rate and regular rhythm.      Heart sounds: Normal heart sounds.   Pulmonary:      Effort: Pulmonary effort is normal.      Breath sounds: Normal breath sounds.   Abdominal:      General: Bowel sounds are normal.      Palpations: Abdomen is soft.   Musculoskeletal:      Cervical back: Normal range of motion and neck supple.   Skin:     General: Skin is warm and dry.   Neurological:      Mental Status: He is alert and oriented to person, place, and time.   Psychiatric:         Behavior: Behavior normal.            Assessment and Plan Additional age appropriate preventative wellness advice topics were discussed during today's preventative wellness exam(some topics already addressed during AWV portion of the note above):    Physical Activity: Advised cardiovascular activity 150 minutes per week as tolerated. (example brisk walk for 30 minutes, 5 days a week).     Coronary artery disease of native artery of native heart with stable angina pectoris      Orders:    rosuvastatin (CRESTOR) 10 MG tablet; Take 1 tablet by mouth Daily.    metoprolol succinate XL (TOPROL-XL) 25 MG 24 hr tablet; Take 0.5 tablets by mouth Daily.    CBC No Differential; Future    Lipid Panel; Future    Comprehensive Metabolic Panel; Future    Medicare annual wellness visit, subsequent  No concerns on Medicare wellness exam today.       Prediabetes    Orders:    CBC No Differential; Future    Lipid Panel; Future    Comprehensive Metabolic Panel; Future  He is agreeable to blood work.  Hemoglobin A1c goal is less than 7.  Hyperlipidemia LDL goal <70       Orders:    CBC No Differential; Future    Lipid Panel; Future    Comprehensive Metabolic Panel; Future    Vitamin D deficiency    Orders:    Vitamin D 25 hydroxy; Future          I spent 30 minutes caring for Jose Luis on this date of service. This time includes time spent by me in the following activities:preparing for the visit,  reviewing tests, obtaining and/or reviewing a separately obtained history, performing a medically appropriate examination and/or evaluation , counseling and educating the patient/family/caregiver, ordering medications, tests, or procedures, documenting information in the medical record, independently interpreting results and communicating that information with the patient/family/caregiver, and care coordination  Follow Up   Return in about 5 months (around 12/14/2025).  Patient was given instructions and counseling regarding his condition or for health maintenance advice. Please see specific information pulled into the AVS if appropriate.

## 2025-07-16 NOTE — ASSESSMENT & PLAN NOTE
{Hyperlipidemia A/P Block (Optional):[id number]}    Orders:    CBC No Differential; Future    Lipid Panel; Future    Comprehensive Metabolic Panel; Future

## 2025-07-16 NOTE — ASSESSMENT & PLAN NOTE
{Coronary Artery Disease (OPTIONAL):78726}    Orders:    rosuvastatin (CRESTOR) 10 MG tablet; Take 1 tablet by mouth Daily.    metoprolol succinate XL (TOPROL-XL) 25 MG 24 hr tablet; Take 0.5 tablets by mouth Daily.    CBC No Differential; Future    Lipid Panel; Future    Comprehensive Metabolic Panel; Future

## 2025-07-18 ENCOUNTER — RESULTS FOLLOW-UP (OUTPATIENT)
Dept: FAMILY MEDICINE CLINIC | Facility: CLINIC | Age: 75
End: 2025-07-18
Payer: MEDICARE

## (undated) DEVICE — CATH DIAG EXPO .056 FL3.5 6F 100CM

## (undated) DEVICE — KT CATH IMG DRAGONFLY/OPSTAR 2.7F 135CM

## (undated) DEVICE — Device: Brand: ASAHI SION BLUE

## (undated) DEVICE — DEV COMPR RADL PRELUDESYNCEZ 30ML 32CM

## (undated) DEVICE — PK CATH CARD 10

## (undated) DEVICE — KT VLV HEMO MAP ACC PLS LG/BORE MTL/INTRO W/TORQ/DEV

## (undated) DEVICE — 6F .070 XB 3.5 100CM: Brand: VISTA BRITE TIP

## (undated) DEVICE — MODEL AT P65, P/N 701554-001KIT CONTENTS: HAND CONTROLLER, 3-WAY HIGH-PRESSURE STOPCOCK WITH ROTATING END AND PREMIUM HIGH-PRESSURE TUBING: Brand: ANGIOTOUCH® KIT

## (undated) DEVICE — GLIDESHEATH BASIC HYDROPHILIC COATED INTRODUCER SHEATH: Brand: GLIDESHEATH

## (undated) DEVICE — ANGIOSCULPT EVO RX PTCA SCORING BALLOON CATHETER WITH HYDROPHILIC COATING, 3.0 MM X 15 MM: Brand: ANGIOSCULPT EVO

## (undated) DEVICE — NDL PERC 1PRT THNWALL W/BASEPLT 18G 7CM

## (undated) DEVICE — CATH DIAG EXPO M/ PK 6FR FL4/FR4 PIG 3PK

## (undated) DEVICE — DEV INFL MONARCH 25W

## (undated) DEVICE — MODEL BT2000 P/N 700287-012KIT CONTENTS: MANIFOLD WITH SALINE AND CONTRAST PORTS, SALINE TUBING WITH SPIKE AND HAND SYRINGE, TRANSDUCER: Brand: BT2000 AUTOMATED MANIFOLD KIT

## (undated) DEVICE — PINNACLE INTRODUCER SHEATH: Brand: PINNACLE

## (undated) DEVICE — NC TREK NEO™ CORONARY DILATATION CATHETER 3.50 MM X 12 MM / RAPID-EXCHANGE: Brand: NC TREK NEO™

## (undated) DEVICE — HI-TORQUE VERSATURN F GUIDE WIRE FULLY COATED .014 STRAIGHT TIP 190 CM: Brand: HI-TORQUE VERSATURN

## (undated) DEVICE — ANGIO-SEAL VIP VASCULAR CLOSURE DEVICE: Brand: ANGIO-SEAL

## (undated) DEVICE — GW PERIPH VASC ADX J/TP SS .035 150CM 3MM

## (undated) DEVICE — ADULT, W/LG. BACK PAD, RADIOTRANSPARENT ELEMENT AND LEAD WIRE COMPATIBLE W/: Brand: DEFIBRILLATION ELECTRODES

## (undated) DEVICE — GW PERIPH GUIDERIGHT STD/EXCHNG/J/TIP SS 0.035IN 5X260CM

## (undated) DEVICE — NC TREK NEO™ CORONARY DILATATION CATHETER 4.50 MM X 12 MM / RAPID-EXCHANGE: Brand: NC TREK NEO™